# Patient Record
Sex: MALE | Race: WHITE | NOT HISPANIC OR LATINO | Employment: FULL TIME | URBAN - METROPOLITAN AREA
[De-identification: names, ages, dates, MRNs, and addresses within clinical notes are randomized per-mention and may not be internally consistent; named-entity substitution may affect disease eponyms.]

---

## 2017-11-01 ENCOUNTER — HOSPITAL ENCOUNTER (OUTPATIENT)
Dept: FAMILY MEDICINE CLINIC | Facility: CLINIC | Age: 42
Setting detail: SPECIMEN
Discharge: HOME | End: 2017-11-01
Attending: FAMILY MEDICINE | Admitting: FAMILY MEDICINE

## 2017-11-01 LAB
ALBUMIN SERPL-MCNC: 4.4 G/DL (ref 3.5–4.8)
ALBUMIN/GLOB SERPL: 1.6 {RATIO} (ref 1–1.7)
ALP SERPL-CCNC: 44 IU/L (ref 32–91)
ALT SERPL-CCNC: 18 IU/L (ref 17–63)
ANION GAP SERPL CALC-SCNC: 13.9 MMOL/L (ref 10–20)
AST SERPL-CCNC: 26 IU/L (ref 15–41)
BASOPHILS # BLD AUTO: 0 10*3/UL (ref 0–0.2)
BASOPHILS NFR BLD AUTO: 1 % (ref 0–2)
BILIRUB SERPL-MCNC: 0.9 MG/DL (ref 0.3–1.2)
BILIRUB UR QL STRIP: NEGATIVE MG/DL
BUN SERPL-MCNC: 16 MG/DL (ref 8–20)
BUN/CREAT SERPL: 16 (ref 6.2–20.3)
CALCIUM SERPL-MCNC: 9.1 MG/DL (ref 8.9–10.3)
CASTS URNS QL MICRO: NORMAL /[LPF]
CHLORIDE SERPL-SCNC: 101 MMOL/L (ref 101–111)
CHOLEST SERPL-MCNC: 214 MG/DL
CHOLEST/HDLC SERPL: 3.6 {RATIO}
COLOR UR: YELLOW
CONV BACTERIA IN URINE MICRO: NEGATIVE
CONV CLARITY OF URINE: CLEAR
CONV CO2: 25 MMOL/L (ref 22–32)
CONV HYALINE CASTS IN URINE MICRO: 1 /[LPF] (ref 0–5)
CONV LDL CHOLESTEROL DIRECT: 143 MG/DL (ref 0–100)
CONV PROTEIN IN URINE BY AUTOMATED TEST STRIP: NEGATIVE MG/DL
CONV SMALL ROUND CELLS: NORMAL /[HPF]
CONV TOTAL PROTEIN: 7.1 G/DL (ref 6.1–7.9)
CONV UROBILINOGEN IN URINE BY AUTOMATED TEST STRIP: 0.2 MG/DL
CREAT UR-MCNC: 1 MG/DL (ref 0.7–1.2)
CULTURE INDICATED?: NORMAL
DIFFERENTIAL METHOD BLD: (no result)
EOSINOPHIL # BLD AUTO: 0.1 10*3/UL (ref 0–0.3)
EOSINOPHIL # BLD AUTO: 3 % (ref 0–3)
ERYTHROCYTE [DISTWIDTH] IN BLOOD BY AUTOMATED COUNT: 12.8 % (ref 11.5–14.5)
GLOBULIN UR ELPH-MCNC: 2.7 G/DL (ref 2.5–3.8)
GLUCOSE SERPL-MCNC: 94 MG/DL (ref 65–99)
GLUCOSE UR QL: NEGATIVE MG/DL
HCT VFR BLD AUTO: 45.3 % (ref 40–54)
HDLC SERPL-MCNC: 60 MG/DL
HGB BLD-MCNC: 15 G/DL (ref 14–18)
HGB UR QL STRIP: NEGATIVE
KETONES UR QL STRIP: NEGATIVE MG/DL
LDLC/HDLC SERPL: 2.4 {RATIO}
LEUKOCYTE ESTERASE UR QL STRIP: NEGATIVE
LIPID INTERPRETATION: ABNORMAL
LYMPHOCYTES # BLD AUTO: 1 10*3/UL (ref 0.8–4.8)
LYMPHOCYTES NFR BLD AUTO: 27 % (ref 18–42)
MCH RBC QN AUTO: 30.8 PG (ref 26–32)
MCHC RBC AUTO-ENTMCNC: 33.2 G/DL (ref 32–36)
MCV RBC AUTO: 92.8 FL (ref 80–94)
MONOCYTES # BLD AUTO: 0.4 10*3/UL (ref 0.1–1.3)
MONOCYTES NFR BLD AUTO: 11 % (ref 2–11)
NEUTROPHILS # BLD AUTO: 2.1 10*3/UL (ref 2.3–8.6)
NEUTROPHILS NFR BLD AUTO: 58 % (ref 50–75)
NITRITE UR QL STRIP: NEGATIVE
NRBC BLD AUTO-RTO: 0 /100{WBCS}
NRBC/RBC NFR BLD MANUAL: 0 10*3/UL
PH UR STRIP.AUTO: 5.5 [PH] (ref 4.5–8)
PLATELET # BLD AUTO: 208 10*3/UL (ref 150–450)
PMV BLD AUTO: 8.1 FL (ref 7.4–10.4)
POTASSIUM SERPL-SCNC: 3.9 MMOL/L (ref 3.6–5.1)
PSA SERPL-MCNC: 0.25 NG/ML (ref 0–4)
RBC # BLD AUTO: 4.88 10*6/UL (ref 4.6–6)
RBC #/AREA URNS HPF: 0 /[HPF] (ref 0–3)
SODIUM SERPL-SCNC: 136 MMOL/L (ref 136–144)
SP GR UR: 1.02 (ref 1–1.03)
SPERM URNS QL MICRO: NORMAL /[HPF]
SQUAMOUS SPT QL MICRO: 0 /[HPF] (ref 0–5)
T4 FREE SERPL-MCNC: 0.99 NG/DL (ref 0.58–1.64)
TRIGL SERPL-MCNC: 64 MG/DL
TSH SERPL-ACNC: 1.43 UIU/ML (ref 0.34–5.6)
UNIDENT CRYS URNS QL MICRO: NORMAL /[HPF]
VIT B12 SERPL-MCNC: 217 PG/ML (ref 180–914)
VLDLC SERPL CALC-MCNC: 11.3 MG/DL
WBC # BLD AUTO: 3.7 10*3/UL (ref 4.5–11.5)
WBC #/AREA URNS HPF: 0 /[HPF] (ref 0–5)
YEAST SPEC QL WET PREP: NORMAL /[HPF]

## 2019-10-10 RX ORDER — ERGOCALCIFEROL 1.25 MG/1
CAPSULE ORAL
Qty: 4 CAPSULE | Refills: 0 | Status: SHIPPED | OUTPATIENT
Start: 2019-10-10 | End: 2020-08-02 | Stop reason: SDUPTHER

## 2020-01-13 RX ORDER — SERTRALINE HYDROCHLORIDE 100 MG/1
TABLET, FILM COATED ORAL
Qty: 30 TABLET | Refills: 2 | Status: SHIPPED | OUTPATIENT
Start: 2020-01-13 | End: 2020-03-02

## 2020-03-02 RX ORDER — SERTRALINE HYDROCHLORIDE 100 MG/1
TABLET, FILM COATED ORAL
Qty: 30 TABLET | Refills: 2 | Status: SHIPPED | OUTPATIENT
Start: 2020-03-02 | End: 2020-08-02 | Stop reason: SDUPTHER

## 2020-07-09 ENCOUNTER — OFFICE VISIT (OUTPATIENT)
Dept: FAMILY MEDICINE CLINIC | Facility: CLINIC | Age: 45
End: 2020-07-09

## 2020-07-09 ENCOUNTER — LAB (OUTPATIENT)
Dept: FAMILY MEDICINE CLINIC | Facility: CLINIC | Age: 45
End: 2020-07-09

## 2020-07-09 VITALS
RESPIRATION RATE: 8 BRPM | DIASTOLIC BLOOD PRESSURE: 82 MMHG | OXYGEN SATURATION: 98 % | BODY MASS INDEX: 30.62 KG/M2 | TEMPERATURE: 96.8 F | HEART RATE: 70 BPM | SYSTOLIC BLOOD PRESSURE: 110 MMHG | WEIGHT: 231 LBS | HEIGHT: 73 IN

## 2020-07-09 DIAGNOSIS — Z00.00 PREVENTATIVE HEALTH CARE: ICD-10-CM

## 2020-07-09 DIAGNOSIS — R09.1 PLEURISY: Primary | ICD-10-CM

## 2020-07-09 PROBLEM — S43.431A SUPERIOR GLENOID LABRUM LESION OF RIGHT SHOULDER: Status: ACTIVE | Noted: 2020-07-09

## 2020-07-09 PROBLEM — E55.9 VITAMIN D DEFICIENCY: Status: ACTIVE | Noted: 2017-11-03

## 2020-07-09 PROBLEM — F41.0 PANIC DISORDER: Status: ACTIVE | Noted: 2017-11-08

## 2020-07-09 PROBLEM — M75.121 COMPLETE TEAR OF RIGHT ROTATOR CUFF: Status: ACTIVE | Noted: 2018-12-03

## 2020-07-09 LAB
25(OH)D3 SERPL-MCNC: 36.2 NG/ML (ref 30–100)
ALBUMIN SERPL-MCNC: 4.8 G/DL (ref 3.5–5.2)
ALBUMIN/GLOB SERPL: 1.7 G/DL
ALP SERPL-CCNC: 55 U/L (ref 39–117)
ALT SERPL W P-5'-P-CCNC: 13 U/L (ref 1–41)
ANION GAP SERPL CALCULATED.3IONS-SCNC: 10.7 MMOL/L (ref 5–15)
AST SERPL-CCNC: 17 U/L (ref 1–40)
BASOPHILS # BLD AUTO: 0 10*3/MM3 (ref 0–0.2)
BASOPHILS NFR BLD AUTO: 0.9 % (ref 0–1.5)
BILIRUB SERPL-MCNC: 0.8 MG/DL (ref 0–1.2)
BILIRUB UR QL STRIP: NEGATIVE
BUN SERPL-MCNC: 17 MG/DL (ref 6–20)
BUN/CREAT SERPL: 15 (ref 7–25)
CALCIUM SPEC-SCNC: 9.9 MG/DL (ref 8.6–10.5)
CHLORIDE SERPL-SCNC: 101 MMOL/L (ref 98–107)
CHOLEST SERPL-MCNC: 245 MG/DL (ref 0–200)
CLARITY UR: ABNORMAL
CO2 SERPL-SCNC: 26.3 MMOL/L (ref 22–29)
COLOR UR: YELLOW
CREAT SERPL-MCNC: 1.13 MG/DL (ref 0.76–1.27)
DEPRECATED RDW RBC AUTO: 42.4 FL (ref 37–54)
EOSINOPHIL # BLD AUTO: 0.2 10*3/MM3 (ref 0–0.4)
EOSINOPHIL NFR BLD AUTO: 5.9 % (ref 0.3–6.2)
ERYTHROCYTE [DISTWIDTH] IN BLOOD BY AUTOMATED COUNT: 13.2 % (ref 12.3–15.4)
GFR SERPL CREATININE-BSD FRML MDRD: 71 ML/MIN/1.73
GLOBULIN UR ELPH-MCNC: 2.9 GM/DL
GLUCOSE SERPL-MCNC: 92 MG/DL (ref 65–99)
GLUCOSE UR STRIP-MCNC: NEGATIVE MG/DL
HBA1C MFR BLD: 5.1 % (ref 3.5–5.6)
HCT VFR BLD AUTO: 45.7 % (ref 37.5–51)
HDLC SERPL-MCNC: 43 MG/DL (ref 40–60)
HGB BLD-MCNC: 15.5 G/DL (ref 13–17.7)
HGB UR QL STRIP.AUTO: NEGATIVE
KETONES UR QL STRIP: NEGATIVE
LDLC SERPL CALC-MCNC: 185 MG/DL (ref 0–100)
LDLC/HDLC SERPL: 4.3 {RATIO}
LEUKOCYTE ESTERASE UR QL STRIP.AUTO: NEGATIVE
LYMPHOCYTES # BLD AUTO: 1.1 10*3/MM3 (ref 0.7–3.1)
LYMPHOCYTES NFR BLD AUTO: 26.6 % (ref 19.6–45.3)
MCH RBC QN AUTO: 30.9 PG (ref 26.6–33)
MCHC RBC AUTO-ENTMCNC: 33.9 G/DL (ref 31.5–35.7)
MCV RBC AUTO: 91.1 FL (ref 79–97)
MONOCYTES # BLD AUTO: 0.4 10*3/MM3 (ref 0.1–0.9)
MONOCYTES NFR BLD AUTO: 9.8 % (ref 5–12)
NEUTROPHILS NFR BLD AUTO: 2.3 10*3/MM3 (ref 1.7–7)
NEUTROPHILS NFR BLD AUTO: 56.8 % (ref 42.7–76)
NITRITE UR QL STRIP: NEGATIVE
NRBC BLD AUTO-RTO: 0.1 /100 WBC (ref 0–0.2)
PH UR STRIP.AUTO: 5.5 [PH] (ref 5–8)
PLATELET # BLD AUTO: 221 10*3/MM3 (ref 140–450)
PMV BLD AUTO: 8.1 FL (ref 6–12)
POTASSIUM SERPL-SCNC: 4.2 MMOL/L (ref 3.5–5.2)
PROT SERPL-MCNC: 7.7 G/DL (ref 6–8.5)
PROT UR QL STRIP: NEGATIVE
RBC # BLD AUTO: 5.02 10*6/MM3 (ref 4.14–5.8)
SODIUM SERPL-SCNC: 138 MMOL/L (ref 136–145)
SP GR UR STRIP: 1.02 (ref 1–1.03)
T4 FREE SERPL-MCNC: 1.18 NG/DL (ref 0.93–1.7)
TRIGL SERPL-MCNC: 86 MG/DL (ref 0–150)
TSH SERPL DL<=0.05 MIU/L-ACNC: 2.54 UIU/ML (ref 0.27–4.2)
UROBILINOGEN UR QL STRIP: ABNORMAL
VIT B12 BLD-MCNC: 311 PG/ML (ref 211–946)
VLDLC SERPL-MCNC: 17.2 MG/DL (ref 5–40)
WBC # BLD AUTO: 4 10*3/MM3 (ref 3.4–10.8)

## 2020-07-09 PROCEDURE — 82306 VITAMIN D 25 HYDROXY: CPT | Performed by: FAMILY MEDICINE

## 2020-07-09 PROCEDURE — 82607 VITAMIN B-12: CPT | Performed by: FAMILY MEDICINE

## 2020-07-09 PROCEDURE — 99213 OFFICE O/P EST LOW 20 MIN: CPT | Performed by: FAMILY MEDICINE

## 2020-07-09 PROCEDURE — 84439 ASSAY OF FREE THYROXINE: CPT | Performed by: FAMILY MEDICINE

## 2020-07-09 PROCEDURE — 84443 ASSAY THYROID STIM HORMONE: CPT | Performed by: FAMILY MEDICINE

## 2020-07-09 PROCEDURE — 80061 LIPID PANEL: CPT | Performed by: FAMILY MEDICINE

## 2020-07-09 PROCEDURE — 80053 COMPREHEN METABOLIC PANEL: CPT | Performed by: FAMILY MEDICINE

## 2020-07-09 PROCEDURE — 85025 COMPLETE CBC W/AUTO DIFF WBC: CPT | Performed by: FAMILY MEDICINE

## 2020-07-09 PROCEDURE — 81003 URINALYSIS AUTO W/O SCOPE: CPT | Performed by: FAMILY MEDICINE

## 2020-07-09 PROCEDURE — 83036 HEMOGLOBIN GLYCOSYLATED A1C: CPT | Performed by: FAMILY MEDICINE

## 2020-07-09 NOTE — PATIENT INSTRUCTIONS
Pleurisy    Pleurisy, also called pleuritis, is irritation and swelling (inflammation) of the linings of the lungs. The linings of the lungs are called pleura. They cover the outside of the lungs and the inside of the chest wall. There is a small amount of fluid (pleural fluid) between the pleura that allows the lungs to move in and out smoothly when you breathe. Pleurisy causes the pleura to be rough and dry and to rub together when you breathe, which is painful. In some cases, pleurisy can cause pleural fluid to build up between the pleura (pleural effusion).  What are the causes?  Common causes of this condition include:  · A lung infection caused by bacteria or a virus.  · A blood clot that travels to the lung (pulmonary embolism).  · Air leaking into the pleural space (pneumothorax).  · Lung cancer or a lung tumor.  · A chest injury.  · Diseases that can cause lung inflammation. These include rheumatoid arthritis, lupus, sickle cell disease, inflammatory bowel disease, and pancreatitis.  · Heart or chest surgery.  · Lung damage from inhaling asbestos.  · A lung reaction to certain medicines.  Sometimes the cause is unknown.  What are the signs or symptoms?  Chest pain is the main symptom of this condition. The pain is usually on one side. Chest pain may start suddenly and be sharp or stabbing. It may become a constant dull ache. You may also feel pain in your back or shoulder. The pain may get worse when you cough, take deep breaths, or make sudden movements. Other symptoms may include:  · Shortness of breath.  · Noisy breathing (wheezing).  · Cough.  · Chills.  · Fever.  How is this diagnosed?  This condition may be diagnosed based on:  · Your medical history.  · Your symptoms.  · A physical exam. Your health care provider will listen to your breathing with a stethoscope to check for a rough, rubbing sound (friction rub). If you have pleural effusion, your breathing sounds may be muffled.  · Tests, such  as:  ? Blood tests to check for infections or diseases and to measure the oxygen in your blood.  ? Imaging studies of your lungs. These may include a chest X-ray, ultrasound, MRI, or CT scan.  ? A procedure to remove pleural fluid with a needle for testing (thoracentesis).  How is this treated?  Treatment for this condition depends on the cause. Pleurisy that was caused by a virus usually clears up within 2 weeks. Treatment for pleurisy may include:  · NSAIDs to help relieve pain and swelling.  · Antibiotic medicines, if your condition was caused by a bacterial infection.  · Prescription pain or cough medicine.  · Medicines to dissolve a blood clot, if your condition was caused by pulmonary embolism.  · Removal of pleural fluid or air.  Follow these instructions at home:  Medicines  · Take over-the-counter and prescription medicines only as told by your health care provider.  · If you were prescribed an antibiotic, take it as told by your health care provider. Do not stop taking the antibiotic even if you start to feel better.  Activity  · Rest and return to your normal activities as told by your health care provider. Ask your health care provider what activities are safe for you.  · Do not drive or use heavy machinery while taking prescription pain medicine.  General instructions    · Monitor your pleurisy for any changes.  · Take deep breaths often, even if it is painful. This can help prevent lung infection (pneumonia) and collapse of lung tissue (atelectasis).  · When lying down, lie on your painful side. This may reduce pain.  · Do not smoke. If you need help quitting, ask your health care provider.  · Keep all follow-up visits as told by your health care provider. This is important.  Contact a health care provider if:  · You have pain that:  ? Gets worse.  ? Does not get better with medicine.  ? Lasts for more than 1 week.  · You have a fever or chills.  · Your cough or shortness of breath is not improving at  home.  · You cough up pus-like (purulent) secretions.  Get help right away if:  · Your lips, fingernails, or toenails darken or turn blue.  · You cough up blood.  · You have any of the following symptoms that get worse:  ? Difficulty breathing.  ? Shortness of breath.  ? Wheezing.  · You have pain that spreads into your neck, arms, or jaw.  · You develop a rash.  · You vomit.  · You faint.  Summary  · Pleurisy is inflammation of the linings of the lungs (pleura).  · Pleurisy causes pain that makes it difficult for you to breathe or cough.  · Pleurisy is often caused by an underlying infection or disease.  · Treatment of pleurisy depends on the cause, and it often includes medicines.  This information is not intended to replace advice given to you by your health care provider. Make sure you discuss any questions you have with your health care provider.  Document Released: 12/18/2006 Document Revised: 11/30/2018 Document Reviewed: 09/11/2017  Elsevier Patient Education © 2020 [employer]

## 2020-07-09 NOTE — PROGRESS NOTES
"Rooming Tab(CC,VS,Pt Hx,Fall Screen)  Chief Complaint   Patient presents with   • Pain     rt rib       Subjective    Patient with right sided rib pain.  Very localized.   No rub.  No fever.  No sweats.          I have reviewed and updated his medications, medical history and problem list during today's office visit.     Patient Care Team:  Brando Piper MD as PCP - General (Family Medicine)    Problem List Tab  Medications Tab  Synopsis Tab  Chart Review Tab  Care Everywhere Tab  Immunizations Tab  Patient History Tab    Social History     Tobacco Use   • Smoking status: Not on file   Substance Use Topics   • Alcohol use: Not on file       Review of Systems   Constitutional: Negative for activity change, appetite change, fatigue, fever and unexpected weight loss.   HENT: Negative for congestion, ear pain, hearing loss, postnasal drip, rhinorrhea, sinus pressure, sneezing, sore throat and swollen glands.    Eyes: Negative for blurred vision.   Respiratory: Negative for cough, shortness of breath and wheezing.    Cardiovascular: Negative for chest pain.   Gastrointestinal: Negative for abdominal pain, nausea and indigestion.   Genitourinary: Negative for dysuria, urgency and urinary incontinence.   Musculoskeletal: Negative for arthralgias, back pain, joint swelling and myalgias.   Skin: Negative for dry skin and skin lesions.   Allergic/Immunologic: Negative for environmental allergies.   Neurological: Negative for dizziness, headache, memory problem and confusion.   Hematological: Negative for adenopathy.   Psychiatric/Behavioral: Negative for agitation, depressed mood and stress. The patient is not nervous/anxious.    All other systems reviewed and are negative.      Objective     Rooming Tab(CC,VS,Pt Hx,Fall Screen)  /82   Pulse 70   Temp 96.8 °F (36 °C) (Temporal)   Resp 8   Ht 185.4 cm (73\")   Wt 105 kg (231 lb)   SpO2 98%   BMI 30.48 kg/m²     Body mass index is 30.48 kg/m².    Physical Exam "   Constitutional: He is oriented to person, place, and time. He appears well-developed and well-nourished.   HENT:   Head: Normocephalic and atraumatic.   Right Ear: External ear normal.   Left Ear: External ear normal.   Nose: Nose normal.   Mouth/Throat: Oropharynx is clear and moist. No oropharyngeal exudate.   Eyes: Pupils are equal, round, and reactive to light. Conjunctivae and EOM are normal. Right eye exhibits no discharge. Left eye exhibits no discharge. No scleral icterus.   Neck: Normal range of motion. Neck supple. No JVD present. No thyromegaly present.   Cardiovascular: Normal rate, regular rhythm, normal heart sounds and intact distal pulses.   No murmur heard.  Pulmonary/Chest: Effort normal and breath sounds normal. No respiratory distress. He has no wheezes. He has no rales. He exhibits no tenderness.   Abdominal: Soft. Bowel sounds are normal. He exhibits no distension. There is no tenderness.   Genitourinary: Rectal exam shows guaiac negative stool.   Musculoskeletal: Normal range of motion. He exhibits no edema, tenderness or deformity.   Lymphadenopathy:     He has no cervical adenopathy.   Neurological: He is alert and oriented to person, place, and time.   Skin: Skin is warm and dry.   Psychiatric: He has a normal mood and affect. His behavior is normal. Judgment and thought content normal.   Vitals reviewed.       Statin Choice Calculator  Data Reviewed:                   Assessment/Plan   Order Review Tab  Health Maintenance Tab  Patient Plan/Order Tab  Diagnoses and all orders for this visit:    1. Pleurisy (Primary)  Assessment & Plan:  Ibuprofen 800mg  Bid.   If not better in 2 weeks then X-ray  Viral induced        Wrapup Tab  No follow-ups on file.

## 2020-07-10 NOTE — PROGRESS NOTES
Mary tell Ashkan that his labs look great except for the lipid panel as we suspected.  Have him follow a low-carb diet and exercise as he was planning to do and we will repeat his lipid panel in about 4 to 6 months.  If it does not come down we have medication that will bring it down.  I think he has a physical scheduled later this year.  We will recheck it then

## 2020-08-03 RX ORDER — SERTRALINE HYDROCHLORIDE 100 MG/1
100 TABLET, FILM COATED ORAL DAILY
Qty: 30 TABLET | Refills: 2 | Status: SHIPPED | OUTPATIENT
Start: 2020-08-03 | End: 2021-03-23

## 2020-08-03 RX ORDER — ERGOCALCIFEROL 1.25 MG/1
50000 CAPSULE ORAL WEEKLY
Qty: 4 CAPSULE | Refills: 0 | Status: SHIPPED | OUTPATIENT
Start: 2020-08-03 | End: 2021-03-04

## 2021-03-03 ENCOUNTER — APPOINTMENT (OUTPATIENT)
Dept: GENERAL RADIOLOGY | Facility: HOSPITAL | Age: 46
End: 2021-03-03

## 2021-03-03 ENCOUNTER — HOSPITAL ENCOUNTER (OUTPATIENT)
Facility: HOSPITAL | Age: 46
Discharge: HOME | End: 2021-03-05
Attending: FAMILY MEDICINE | Admitting: FAMILY MEDICINE

## 2021-03-03 DIAGNOSIS — I48.91 NEW ONSET ATRIAL FIBRILLATION (HCC): ICD-10-CM

## 2021-03-03 DIAGNOSIS — E78.5 HYPERLIPIDEMIA LDL GOAL <70: ICD-10-CM

## 2021-03-03 DIAGNOSIS — R00.2 PALPITATIONS: ICD-10-CM

## 2021-03-03 DIAGNOSIS — R94.39 ABNORMAL NUCLEAR STRESS TEST: Primary | ICD-10-CM

## 2021-03-03 LAB
BASOPHILS # BLD AUTO: 0 10*3/MM3 (ref 0–0.2)
BASOPHILS NFR BLD AUTO: 0.8 % (ref 0–1.5)
DEPRECATED RDW RBC AUTO: 41.6 FL (ref 37–54)
EOSINOPHIL # BLD AUTO: 0.2 10*3/MM3 (ref 0–0.4)
EOSINOPHIL NFR BLD AUTO: 3.8 % (ref 0.3–6.2)
ERYTHROCYTE [DISTWIDTH] IN BLOOD BY AUTOMATED COUNT: 13.1 % (ref 12.3–15.4)
HCT VFR BLD AUTO: 43 % (ref 37.5–51)
HGB BLD-MCNC: 14.7 G/DL (ref 13–17.7)
LYMPHOCYTES # BLD AUTO: 1.6 10*3/MM3 (ref 0.7–3.1)
LYMPHOCYTES NFR BLD AUTO: 37.4 % (ref 19.6–45.3)
MCH RBC QN AUTO: 30.9 PG (ref 26.6–33)
MCHC RBC AUTO-ENTMCNC: 34.1 G/DL (ref 31.5–35.7)
MCV RBC AUTO: 90.5 FL (ref 79–97)
MONOCYTES # BLD AUTO: 0.4 10*3/MM3 (ref 0.1–0.9)
MONOCYTES NFR BLD AUTO: 9.4 % (ref 5–12)
NEUTROPHILS NFR BLD AUTO: 2.1 10*3/MM3 (ref 1.7–7)
NEUTROPHILS NFR BLD AUTO: 48.6 % (ref 42.7–76)
NRBC BLD AUTO-RTO: 0.2 /100 WBC (ref 0–0.2)
PLATELET # BLD AUTO: 211 10*3/MM3 (ref 140–450)
PMV BLD AUTO: 7.9 FL (ref 6–12)
RBC # BLD AUTO: 4.75 10*6/MM3 (ref 4.14–5.8)
WBC # BLD AUTO: 4.4 10*3/MM3 (ref 3.4–10.8)

## 2021-03-03 PROCEDURE — 96376 TX/PRO/DX INJ SAME DRUG ADON: CPT

## 2021-03-03 PROCEDURE — 83735 ASSAY OF MAGNESIUM: CPT | Performed by: NURSE PRACTITIONER

## 2021-03-03 PROCEDURE — 84484 ASSAY OF TROPONIN QUANT: CPT | Performed by: EMERGENCY MEDICINE

## 2021-03-03 PROCEDURE — 96365 THER/PROPH/DIAG IV INF INIT: CPT

## 2021-03-03 PROCEDURE — 85730 THROMBOPLASTIN TIME PARTIAL: CPT | Performed by: EMERGENCY MEDICINE

## 2021-03-03 PROCEDURE — 71045 X-RAY EXAM CHEST 1 VIEW: CPT

## 2021-03-03 PROCEDURE — 99284 EMERGENCY DEPT VISIT MOD MDM: CPT

## 2021-03-03 PROCEDURE — 85025 COMPLETE CBC W/AUTO DIFF WBC: CPT | Performed by: EMERGENCY MEDICINE

## 2021-03-03 PROCEDURE — 80053 COMPREHEN METABOLIC PANEL: CPT | Performed by: EMERGENCY MEDICINE

## 2021-03-03 PROCEDURE — 85610 PROTHROMBIN TIME: CPT | Performed by: EMERGENCY MEDICINE

## 2021-03-03 PROCEDURE — 84443 ASSAY THYROID STIM HORMONE: CPT | Performed by: EMERGENCY MEDICINE

## 2021-03-03 PROCEDURE — 93005 ELECTROCARDIOGRAM TRACING: CPT

## 2021-03-03 PROCEDURE — 85379 FIBRIN DEGRADATION QUANT: CPT | Performed by: NURSE PRACTITIONER

## 2021-03-03 PROCEDURE — 93005 ELECTROCARDIOGRAM TRACING: CPT | Performed by: EMERGENCY MEDICINE

## 2021-03-03 RX ORDER — DILTIAZEM HYDROCHLORIDE 5 MG/ML
20 INJECTION INTRAVENOUS ONCE
Status: COMPLETED | OUTPATIENT
Start: 2021-03-03 | End: 2021-03-03

## 2021-03-03 RX ORDER — ASPIRIN 325 MG
325 TABLET ORAL ONCE
Status: COMPLETED | OUTPATIENT
Start: 2021-03-03 | End: 2021-03-03

## 2021-03-03 RX ADMIN — DILTIAZEM HYDROCHLORIDE 20 MG: 5 INJECTION INTRAVENOUS at 23:50

## 2021-03-03 RX ADMIN — ASPIRIN 325 MG ORAL TABLET 325 MG: 325 PILL ORAL at 23:49

## 2021-03-03 RX ADMIN — SODIUM CHLORIDE 10 MG/HR: 900 INJECTION, SOLUTION INTRAVENOUS at 23:49

## 2021-03-03 RX ADMIN — SODIUM CHLORIDE 500 ML: 9 INJECTION, SOLUTION INTRAVENOUS at 23:49

## 2021-03-04 ENCOUNTER — APPOINTMENT (OUTPATIENT)
Dept: NUCLEAR MEDICINE | Facility: HOSPITAL | Age: 46
End: 2021-03-04

## 2021-03-04 ENCOUNTER — APPOINTMENT (OUTPATIENT)
Dept: CARDIOLOGY | Facility: HOSPITAL | Age: 46
End: 2021-03-04

## 2021-03-04 PROBLEM — F32.A DEPRESSION: Chronic | Status: ACTIVE | Noted: 2021-03-04

## 2021-03-04 PROBLEM — R94.39 ABNORMAL NUCLEAR STRESS TEST: Status: ACTIVE | Noted: 2021-03-03

## 2021-03-04 PROBLEM — I48.91 NEW ONSET ATRIAL FIBRILLATION: Status: ACTIVE | Noted: 2021-03-04

## 2021-03-04 PROBLEM — E78.5 HYPERLIPIDEMIA LDL GOAL <70: Status: ACTIVE | Noted: 2021-03-03

## 2021-03-04 LAB
ALBUMIN SERPL-MCNC: 4.5 G/DL (ref 3.5–5.2)
ALBUMIN/GLOB SERPL: 1.7 G/DL
ALP SERPL-CCNC: 55 U/L (ref 39–117)
ALT SERPL W P-5'-P-CCNC: 21 U/L (ref 1–41)
ANION GAP SERPL CALCULATED.3IONS-SCNC: 13 MMOL/L (ref 5–15)
ANION GAP SERPL CALCULATED.3IONS-SCNC: 16 MMOL/L (ref 5–15)
ANION GAP SERPL CALCULATED.3IONS-SCNC: 9 MMOL/L (ref 5–15)
APTT PPP: 25.1 SECONDS (ref 24–31)
AST SERPL-CCNC: 25 U/L (ref 1–40)
BASOPHILS # BLD AUTO: 0 10*3/MM3 (ref 0–0.2)
BASOPHILS NFR BLD AUTO: 0.7 % (ref 0–1.5)
BILIRUB SERPL-MCNC: 0.3 MG/DL (ref 0–1.2)
BUN SERPL-MCNC: 17 MG/DL (ref 6–20)
BUN SERPL-MCNC: 20 MG/DL (ref 6–20)
BUN SERPL-MCNC: 21 MG/DL (ref 6–20)
BUN/CREAT SERPL: 16.7 (ref 7–25)
BUN/CREAT SERPL: 18.4 (ref 7–25)
BUN/CREAT SERPL: 19 (ref 7–25)
CALCIUM SPEC-SCNC: 9.1 MG/DL (ref 8.6–10.5)
CALCIUM SPEC-SCNC: 9.2 MG/DL (ref 8.6–10.5)
CALCIUM SPEC-SCNC: 9.5 MG/DL (ref 8.6–10.5)
CHLORIDE SERPL-SCNC: 100 MMOL/L (ref 98–107)
CHLORIDE SERPL-SCNC: 103 MMOL/L (ref 98–107)
CHLORIDE SERPL-SCNC: 106 MMOL/L (ref 98–107)
CHOLEST SERPL-MCNC: 305 MG/DL (ref 0–200)
CO2 SERPL-SCNC: 22 MMOL/L (ref 22–29)
CO2 SERPL-SCNC: 23 MMOL/L (ref 22–29)
CO2 SERPL-SCNC: 26 MMOL/L (ref 22–29)
CREAT SERPL-MCNC: 1.02 MG/DL (ref 0.76–1.27)
CREAT SERPL-MCNC: 1.05 MG/DL (ref 0.76–1.27)
CREAT SERPL-MCNC: 1.14 MG/DL (ref 0.76–1.27)
D DIMER PPP FEU-MCNC: <0.19 MG/L (FEU) (ref 0–0.59)
DEPRECATED RDW RBC AUTO: 41.1 FL (ref 37–54)
EOSINOPHIL # BLD AUTO: 0.1 10*3/MM3 (ref 0–0.4)
EOSINOPHIL NFR BLD AUTO: 2.3 % (ref 0.3–6.2)
ERYTHROCYTE [DISTWIDTH] IN BLOOD BY AUTOMATED COUNT: 13.2 % (ref 12.3–15.4)
GFR SERPL CREATININE-BSD FRML MDRD: 70 ML/MIN/1.73
GFR SERPL CREATININE-BSD FRML MDRD: 77 ML/MIN/1.73
GFR SERPL CREATININE-BSD FRML MDRD: 80 ML/MIN/1.73
GLOBULIN UR ELPH-MCNC: 2.7 GM/DL
GLUCOSE SERPL-MCNC: 73 MG/DL (ref 65–99)
GLUCOSE SERPL-MCNC: 87 MG/DL (ref 65–99)
GLUCOSE SERPL-MCNC: 92 MG/DL (ref 65–99)
HBA1C MFR BLD: 5.3 % (ref 3.5–5.6)
HCT VFR BLD AUTO: 42.2 % (ref 37.5–51)
HDLC SERPL-MCNC: 35 MG/DL (ref 40–60)
HGB BLD-MCNC: 14.5 G/DL (ref 13–17.7)
HOLD SPECIMEN: NORMAL
INR PPP: 0.97 (ref 0.93–1.1)
LDLC SERPL CALC-MCNC: 253 MG/DL (ref 0–100)
LDLC/HDLC SERPL: 7.16 {RATIO}
LYMPHOCYTES # BLD AUTO: 1.1 10*3/MM3 (ref 0.7–3.1)
LYMPHOCYTES NFR BLD AUTO: 27.6 % (ref 19.6–45.3)
MAGNESIUM SERPL-MCNC: 2 MG/DL (ref 1.6–2.6)
MAGNESIUM SERPL-MCNC: 2 MG/DL (ref 1.6–2.6)
MCH RBC QN AUTO: 31 PG (ref 26.6–33)
MCHC RBC AUTO-ENTMCNC: 34.2 G/DL (ref 31.5–35.7)
MCV RBC AUTO: 90.4 FL (ref 79–97)
MONOCYTES # BLD AUTO: 0.3 10*3/MM3 (ref 0.1–0.9)
MONOCYTES NFR BLD AUTO: 8.2 % (ref 5–12)
NEUTROPHILS NFR BLD AUTO: 2.4 10*3/MM3 (ref 1.7–7)
NEUTROPHILS NFR BLD AUTO: 61.2 % (ref 42.7–76)
NRBC BLD AUTO-RTO: 0.1 /100 WBC (ref 0–0.2)
PLATELET # BLD AUTO: 219 10*3/MM3 (ref 140–450)
PMV BLD AUTO: 8.2 FL (ref 6–12)
POTASSIUM SERPL-SCNC: 3.7 MMOL/L (ref 3.5–5.2)
POTASSIUM SERPL-SCNC: 4.3 MMOL/L (ref 3.5–5.2)
POTASSIUM SERPL-SCNC: 5 MMOL/L (ref 3.5–5.2)
PROT SERPL-MCNC: 7.2 G/DL (ref 6–8.5)
PROTHROMBIN TIME: 10.7 SECONDS (ref 9.6–11.7)
RBC # BLD AUTO: 4.67 10*6/MM3 (ref 4.14–5.8)
SARS-COV-2 ORF1AB RESP QL NAA+PROBE: NOT DETECTED
SODIUM SERPL-SCNC: 138 MMOL/L (ref 136–145)
SODIUM SERPL-SCNC: 139 MMOL/L (ref 136–145)
SODIUM SERPL-SCNC: 141 MMOL/L (ref 136–145)
T4 FREE SERPL-MCNC: 1.11 NG/DL (ref 0.93–1.7)
TRIGL SERPL-MCNC: 97 MG/DL (ref 0–150)
TROPONIN T SERPL-MCNC: <0.01 NG/ML (ref 0–0.03)
TSH SERPL DL<=0.05 MIU/L-ACNC: 5.22 UIU/ML (ref 0.27–4.2)
VLDLC SERPL-MCNC: 17 MG/DL (ref 5–40)
WBC # BLD AUTO: 3.9 10*3/MM3 (ref 3.4–10.8)

## 2021-03-04 PROCEDURE — G0378 HOSPITAL OBSERVATION PER HR: HCPCS

## 2021-03-04 PROCEDURE — 84484 ASSAY OF TROPONIN QUANT: CPT | Performed by: NURSE PRACTITIONER

## 2021-03-04 PROCEDURE — U0004 COV-19 TEST NON-CDC HGH THRU: HCPCS | Performed by: EMERGENCY MEDICINE

## 2021-03-04 PROCEDURE — 78452 HT MUSCLE IMAGE SPECT MULT: CPT | Performed by: INTERNAL MEDICINE

## 2021-03-04 PROCEDURE — 80048 BASIC METABOLIC PNL TOTAL CA: CPT | Performed by: NURSE PRACTITIONER

## 2021-03-04 PROCEDURE — 99205 OFFICE O/P NEW HI 60 MIN: CPT | Performed by: INTERNAL MEDICINE

## 2021-03-04 PROCEDURE — 99152 MOD SED SAME PHYS/QHP 5/>YRS: CPT | Performed by: INTERNAL MEDICINE

## 2021-03-04 PROCEDURE — 93017 CV STRESS TEST TRACING ONLY: CPT

## 2021-03-04 PROCEDURE — 93005 ELECTROCARDIOGRAM TRACING: CPT | Performed by: INTERNAL MEDICINE

## 2021-03-04 PROCEDURE — 25010000002 AMIODARONE IN DEXTROSE 5% 150-4.21 MG/100ML-% SOLUTION: Performed by: INTERNAL MEDICINE

## 2021-03-04 PROCEDURE — 25010000002 ENOXAPARIN PER 10 MG: Performed by: NURSE PRACTITIONER

## 2021-03-04 PROCEDURE — 96375 TX/PRO/DX INJ NEW DRUG ADDON: CPT

## 2021-03-04 PROCEDURE — 80061 LIPID PANEL: CPT | Performed by: NURSE PRACTITIONER

## 2021-03-04 PROCEDURE — 83735 ASSAY OF MAGNESIUM: CPT | Performed by: NURSE PRACTITIONER

## 2021-03-04 PROCEDURE — 96372 THER/PROPH/DIAG INJ SC/IM: CPT

## 2021-03-04 PROCEDURE — 25010000002 REGADENOSON 0.4 MG/5ML SOLUTION: Performed by: FAMILY MEDICINE

## 2021-03-04 PROCEDURE — 96366 THER/PROPH/DIAG IV INF ADDON: CPT

## 2021-03-04 PROCEDURE — 25010000002 KETOROLAC TROMETHAMINE PER 15 MG: Performed by: NURSE PRACTITIONER

## 2021-03-04 PROCEDURE — 93005 ELECTROCARDIOGRAM TRACING: CPT | Performed by: NURSE PRACTITIONER

## 2021-03-04 PROCEDURE — A9500 TC99M SESTAMIBI: HCPCS | Performed by: FAMILY MEDICINE

## 2021-03-04 PROCEDURE — 25010000002 AMIODARONE PER 30 MG: Performed by: INTERNAL MEDICINE

## 2021-03-04 PROCEDURE — 93018 CV STRESS TEST I&R ONLY: CPT | Performed by: NURSE PRACTITIONER

## 2021-03-04 PROCEDURE — 78452 HT MUSCLE IMAGE SPECT MULT: CPT

## 2021-03-04 PROCEDURE — 93306 TTE W/DOPPLER COMPLETE: CPT

## 2021-03-04 PROCEDURE — 0 TECHNETIUM SESTAMIBI: Performed by: FAMILY MEDICINE

## 2021-03-04 PROCEDURE — 80048 BASIC METABOLIC PNL TOTAL CA: CPT | Performed by: INTERNAL MEDICINE

## 2021-03-04 PROCEDURE — 93306 TTE W/DOPPLER COMPLETE: CPT | Performed by: INTERNAL MEDICINE

## 2021-03-04 PROCEDURE — 83036 HEMOGLOBIN GLYCOSYLATED A1C: CPT | Performed by: NURSE PRACTITIONER

## 2021-03-04 PROCEDURE — 99220 PR INITIAL OBSERVATION CARE/DAY 70 MINUTES: CPT | Performed by: FAMILY MEDICINE

## 2021-03-04 PROCEDURE — 85025 COMPLETE CBC W/AUTO DIFF WBC: CPT | Performed by: NURSE PRACTITIONER

## 2021-03-04 PROCEDURE — 84439 ASSAY OF FREE THYROXINE: CPT | Performed by: FAMILY MEDICINE

## 2021-03-04 PROCEDURE — 93010 ELECTROCARDIOGRAM REPORT: CPT | Performed by: INTERNAL MEDICINE

## 2021-03-04 PROCEDURE — 96368 THER/DIAG CONCURRENT INF: CPT

## 2021-03-04 RX ORDER — ONDANSETRON 2 MG/ML
4 INJECTION INTRAMUSCULAR; INTRAVENOUS EVERY 6 HOURS PRN
Status: CANCELLED | OUTPATIENT
Start: 2021-03-04

## 2021-03-04 RX ORDER — AMIODARONE HCL/D5W 450 MG/250
1 PLASTIC BAG, INJECTION (ML) INTRAVENOUS CONTINUOUS
Status: DISPENSED | OUTPATIENT
Start: 2021-03-04 | End: 2021-03-04

## 2021-03-04 RX ORDER — ACETAMINOPHEN 325 MG/1
650 TABLET ORAL EVERY 4 HOURS PRN
Status: CANCELLED | OUTPATIENT
Start: 2021-03-04

## 2021-03-04 RX ORDER — METOPROLOL TARTRATE 5 MG/5ML
2.5 INJECTION INTRAVENOUS 4 TIMES DAILY PRN
Status: DISCONTINUED | OUTPATIENT
Start: 2021-03-04 | End: 2021-03-05 | Stop reason: HOSPADM

## 2021-03-04 RX ORDER — ACETAMINOPHEN 650 MG/1
650 SUPPOSITORY RECTAL EVERY 4 HOURS PRN
Status: CANCELLED | OUTPATIENT
Start: 2021-03-04

## 2021-03-04 RX ORDER — NITROGLYCERIN 0.4 MG/1
0.4 TABLET SUBLINGUAL
Status: DISCONTINUED | OUTPATIENT
Start: 2021-03-04 | End: 2021-03-05 | Stop reason: HOSPADM

## 2021-03-04 RX ORDER — HYDROCODONE BITARTRATE AND ACETAMINOPHEN 5; 325 MG/1; MG/1
1 TABLET ORAL EVERY 4 HOURS PRN
Status: CANCELLED | OUTPATIENT
Start: 2021-03-04 | End: 2021-03-14

## 2021-03-04 RX ORDER — ALUMINA, MAGNESIA, AND SIMETHICONE 2400; 2400; 240 MG/30ML; MG/30ML; MG/30ML
15 SUSPENSION ORAL EVERY 6 HOURS PRN
Status: DISCONTINUED | OUTPATIENT
Start: 2021-03-04 | End: 2021-03-05

## 2021-03-04 RX ORDER — BISACODYL 10 MG
10 SUPPOSITORY, RECTAL RECTAL DAILY PRN
Status: DISCONTINUED | OUTPATIENT
Start: 2021-03-04 | End: 2021-03-05 | Stop reason: HOSPADM

## 2021-03-04 RX ORDER — ACETAMINOPHEN 650 MG/1
650 SUPPOSITORY RECTAL EVERY 4 HOURS PRN
Status: DISCONTINUED | OUTPATIENT
Start: 2021-03-04 | End: 2021-03-05 | Stop reason: HOSPADM

## 2021-03-04 RX ORDER — POTASSIUM CHLORIDE 20 MEQ/1
40 TABLET, EXTENDED RELEASE ORAL AS NEEDED
Status: DISCONTINUED | OUTPATIENT
Start: 2021-03-04 | End: 2021-03-05 | Stop reason: HOSPADM

## 2021-03-04 RX ORDER — METHYLPREDNISOLONE SODIUM SUCCINATE 125 MG/2ML
120 INJECTION, POWDER, LYOPHILIZED, FOR SOLUTION INTRAMUSCULAR; INTRAVENOUS ONCE
Status: CANCELLED | OUTPATIENT
Start: 2021-03-04 | End: 2021-03-04

## 2021-03-04 RX ORDER — MAGNESIUM SULFATE HEPTAHYDRATE 40 MG/ML
2 INJECTION, SOLUTION INTRAVENOUS AS NEEDED
Status: DISCONTINUED | OUTPATIENT
Start: 2021-03-04 | End: 2021-03-05 | Stop reason: HOSPADM

## 2021-03-04 RX ORDER — ALUMINA, MAGNESIA, AND SIMETHICONE 2400; 2400; 240 MG/30ML; MG/30ML; MG/30ML
15 SUSPENSION ORAL EVERY 6 HOURS PRN
Status: CANCELLED | OUTPATIENT
Start: 2021-03-04

## 2021-03-04 RX ORDER — DIPHENHYDRAMINE HYDROCHLORIDE 50 MG/ML
50 INJECTION INTRAMUSCULAR; INTRAVENOUS ONCE
Status: CANCELLED | OUTPATIENT
Start: 2021-03-04 | End: 2021-03-04

## 2021-03-04 RX ORDER — BISACODYL 10 MG
10 SUPPOSITORY, RECTAL RECTAL DAILY PRN
Status: CANCELLED | OUTPATIENT
Start: 2021-03-04

## 2021-03-04 RX ORDER — ACETAMINOPHEN 160 MG/5ML
650 SOLUTION ORAL EVERY 4 HOURS PRN
Status: DISCONTINUED | OUTPATIENT
Start: 2021-03-04 | End: 2021-03-05 | Stop reason: HOSPADM

## 2021-03-04 RX ORDER — MELATONIN
1000 DAILY
COMMUNITY

## 2021-03-04 RX ORDER — ONDANSETRON 4 MG/1
4 TABLET, FILM COATED ORAL EVERY 6 HOURS PRN
Status: CANCELLED | OUTPATIENT
Start: 2021-03-04

## 2021-03-04 RX ORDER — CHOLECALCIFEROL (VITAMIN D3) 125 MCG
5 CAPSULE ORAL NIGHTLY PRN
Status: DISCONTINUED | OUTPATIENT
Start: 2021-03-04 | End: 2021-03-05 | Stop reason: HOSPADM

## 2021-03-04 RX ORDER — NITROGLYCERIN 0.4 MG/1
0.4 TABLET SUBLINGUAL
Status: CANCELLED | OUTPATIENT
Start: 2021-03-04

## 2021-03-04 RX ORDER — SODIUM CHLORIDE 0.9 % (FLUSH) 0.9 %
3-10 SYRINGE (ML) INJECTION AS NEEDED
Status: CANCELLED | OUTPATIENT
Start: 2021-03-04

## 2021-03-04 RX ORDER — ASPIRIN 81 MG/1
324 TABLET, CHEWABLE ORAL ONCE
Status: CANCELLED | OUTPATIENT
Start: 2021-03-04 | End: 2021-03-04

## 2021-03-04 RX ORDER — ONDANSETRON 2 MG/ML
4 INJECTION INTRAMUSCULAR; INTRAVENOUS EVERY 6 HOURS PRN
Status: DISCONTINUED | OUTPATIENT
Start: 2021-03-04 | End: 2021-03-05 | Stop reason: HOSPADM

## 2021-03-04 RX ORDER — ACETAMINOPHEN 160 MG/5ML
650 SOLUTION ORAL EVERY 4 HOURS PRN
Status: CANCELLED | OUTPATIENT
Start: 2021-03-04

## 2021-03-04 RX ORDER — MAGNESIUM SULFATE HEPTAHYDRATE 40 MG/ML
4 INJECTION, SOLUTION INTRAVENOUS AS NEEDED
Status: DISCONTINUED | OUTPATIENT
Start: 2021-03-04 | End: 2021-03-05 | Stop reason: HOSPADM

## 2021-03-04 RX ORDER — AMIODARONE HCL/D5W 450 MG/250
1 PLASTIC BAG, INJECTION (ML) INTRAVENOUS
Status: DISCONTINUED | OUTPATIENT
Start: 2021-03-04 | End: 2021-03-04

## 2021-03-04 RX ORDER — KETOROLAC TROMETHAMINE 15 MG/ML
15 INJECTION, SOLUTION INTRAMUSCULAR; INTRAVENOUS EVERY 6 HOURS PRN
Status: DISCONTINUED | OUTPATIENT
Start: 2021-03-04 | End: 2021-03-05 | Stop reason: HOSPADM

## 2021-03-04 RX ORDER — ACETAMINOPHEN 325 MG/1
650 TABLET ORAL EVERY 4 HOURS PRN
Status: DISCONTINUED | OUTPATIENT
Start: 2021-03-04 | End: 2021-03-05 | Stop reason: HOSPADM

## 2021-03-04 RX ORDER — ASPIRIN 81 MG/1
81 TABLET ORAL DAILY
Status: CANCELLED | OUTPATIENT
Start: 2021-03-04

## 2021-03-04 RX ORDER — SERTRALINE HYDROCHLORIDE 100 MG/1
100 TABLET, FILM COATED ORAL DAILY
Status: DISCONTINUED | OUTPATIENT
Start: 2021-03-04 | End: 2021-03-05 | Stop reason: HOSPADM

## 2021-03-04 RX ORDER — POTASSIUM CHLORIDE 1.5 G/1.77G
40 POWDER, FOR SOLUTION ORAL AS NEEDED
Status: DISCONTINUED | OUTPATIENT
Start: 2021-03-04 | End: 2021-03-05 | Stop reason: HOSPADM

## 2021-03-04 RX ORDER — MELATONIN
1000 DAILY
Status: DISCONTINUED | OUTPATIENT
Start: 2021-03-04 | End: 2021-03-05 | Stop reason: HOSPADM

## 2021-03-04 RX ORDER — CHOLECALCIFEROL (VITAMIN D3) 125 MCG
5 CAPSULE ORAL NIGHTLY PRN
Status: CANCELLED | OUTPATIENT
Start: 2021-03-04

## 2021-03-04 RX ORDER — ONDANSETRON 4 MG/1
4 TABLET, FILM COATED ORAL EVERY 6 HOURS PRN
Status: DISCONTINUED | OUTPATIENT
Start: 2021-03-04 | End: 2021-03-05 | Stop reason: HOSPADM

## 2021-03-04 RX ORDER — KETOROLAC TROMETHAMINE 15 MG/ML
15 INJECTION, SOLUTION INTRAMUSCULAR; INTRAVENOUS EVERY 6 HOURS PRN
Status: CANCELLED | OUTPATIENT
Start: 2021-03-04 | End: 2021-03-06

## 2021-03-04 RX ORDER — AMIODARONE HCL/D5W 450 MG/250
0.5 PLASTIC BAG, INJECTION (ML) INTRAVENOUS CONTINUOUS
Status: DISCONTINUED | OUTPATIENT
Start: 2021-03-04 | End: 2021-03-05 | Stop reason: HOSPADM

## 2021-03-04 RX ORDER — SODIUM CHLORIDE 0.9 % (FLUSH) 0.9 %
3 SYRINGE (ML) INJECTION EVERY 12 HOURS SCHEDULED
Status: CANCELLED | OUTPATIENT
Start: 2021-03-04

## 2021-03-04 RX ADMIN — Medication 1000 UNITS: at 13:30

## 2021-03-04 RX ADMIN — SODIUM CHLORIDE 5 MG/HR: 900 INJECTION, SOLUTION INTRAVENOUS at 17:48

## 2021-03-04 RX ADMIN — AMIODARONE HYDROCHLORIDE 0.5 MG/MIN: 50 INJECTION, SOLUTION INTRAVENOUS at 23:24

## 2021-03-04 RX ADMIN — ENOXAPARIN SODIUM 110 MG: 120 INJECTION SUBCUTANEOUS at 14:45

## 2021-03-04 RX ADMIN — SODIUM CHLORIDE 10 MG/HR: 900 INJECTION, SOLUTION INTRAVENOUS at 14:40

## 2021-03-04 RX ADMIN — TECHNETIUM TC 99M SESTAMIBI 1 DOSE: 1 INJECTION INTRAVENOUS at 09:45

## 2021-03-04 RX ADMIN — TECHNETIUM TC 99M SESTAMIBI 1 DOSE: 1 INJECTION INTRAVENOUS at 12:25

## 2021-03-04 RX ADMIN — KETOROLAC TROMETHAMINE 15 MG: 15 INJECTION, SOLUTION INTRAMUSCULAR; INTRAVENOUS at 14:46

## 2021-03-04 RX ADMIN — REGADENOSON 0.4 MG: 0.08 INJECTION, SOLUTION INTRAVENOUS at 12:25

## 2021-03-04 RX ADMIN — AMIODARONE HYDROCHLORIDE 1 MG/MIN: 50 INJECTION, SOLUTION INTRAVENOUS at 17:39

## 2021-03-04 RX ADMIN — AMIODARONE HYDROCHLORIDE 150 MG: 1.5 INJECTION, SOLUTION INTRAVENOUS at 17:30

## 2021-03-04 RX ADMIN — ENOXAPARIN SODIUM 110 MG: 120 INJECTION SUBCUTANEOUS at 04:06

## 2021-03-04 RX ADMIN — SERTRALINE HYDROCHLORIDE 100 MG: 100 TABLET ORAL at 13:31

## 2021-03-04 NOTE — ED NOTES
Patient reports laying in bed tonight and felt his heart begin racing. Denies SOA or pain, reports some feeling of lightheaded. Denies any cardiac history.     Teresa Calvo LPN  03/03/21 0900

## 2021-03-04 NOTE — CONSULTS
Cardiology Consult Note      REQUESTING PHYSICIAN    Morris Jefferson,*    PATIENT IDENTIFICATION  Name: Ashkan Farley  Age: 45 y.o.  Sex: male  :  1975  MRN: [id number]             REASON FOR CONSULTATION:  45-year-old male with no known history of ischemic heart disease.  He is on no home medications with exception of sertraline for depression.  He does not see PCP regularly.      CC:  Palpitations    HISTORY OF PRESENT ILLNESS:   Patient presented to [facility] 3/3/2021 with report of palpitations that began approximately 1 hour prior.  He reports associated diaphoresis.  He denies any actual chest pain, pressure, tightness.  He denies any shortness of breath, lower extremity edema, dizziness or lightheadedness.  Patient did state he has had palpitations in the past, thought to be due to anxiety.  Hence he was started on sertraline.    Patient was given 20 mg bolus of IV Cardizem and started on Cardizem drip.  He is on treatment dose Lovenox.  He was also started on full dose aspirin.  TSH 5.22, troponin negative x2, D-dimer negative.  2D echocardiogram and stress testing were ordered.  In nuclear stress lab, patient's heart rate 111-115.  After Lexiscan administered, heart rate elevated to 181.  He was relatively asymptomatic.  Patient was given 2.5 mg IV Lopressor with improvement in heart rate.  He remained in A. fib.  Patient reports father has history of A. fib as well.    Nuclear stress test performed and demonstrated abnormal perfusion with stress involving the inferior wall suggestive of ischemia.  Risk benefits and options discussed.  Patient wishes to proceed with cardiac catheterization.      REVIEW OF SYSTEMS:  Pertinent items are noted in HPI, all other systems reviewed and negative    OBJECTIVE       ASSESSMENT/PLAN  New onset atrial fibrillation with rapid ventricular response  History depression  Abnormal myocardial perfusion scan suggestive inferior  "ischemia.    Recommendations:  Risk benefits and options discussed we will proceed with cardiac catheterization.  2D echocardiogram pending  Chads vas score is low.  Recommend full dose aspirin at the present time.      Vital Signs  Visit Vitals  /64 (BP Location: Left arm, Patient Position: Lying)   Pulse 74   Temp 97.7 °F (36.5 °C) (Oral)   Resp 16   Ht 185.4 cm (73\")   Wt 106 kg (233 lb 0.4 oz)   SpO2 99%   BMI 30.74 kg/m²     Oxygen Therapy  SpO2: 99 %  Pulse Oximetry Type: Continuous  Device (Oxygen Therapy): room air  Flowsheet Rows      First Filed Value   Admission Height  185.4 cm (73\") Documented at 03/03/2021 2257   Admission Weight  107 kg (236 lb 12.4 oz) Documented at 03/03/2021 2257        Intake & Output (last 3 days)       03/01 0701 - 03/02 0700 03/02 0701 - 03/03 0700 03/03 0701 - 03/04 0700 03/04 0701 - 03/05 0700    IV Piggyback   500     Total Intake(mL/kg)   500 (4.7)     Net   +500                 Lines, Drains & Airways    Active LDAs     Name:   Placement date:   Placement time:   Site:   Days:    Peripheral IV 03/03/21 2334 Right Antecubital   03/03/21 2334    Antecubital   less than 1                MEDICAL HISTORY    Past Medical History:   Diagnosis Date   • Depression         SURGICAL HISTORY    History reviewed. No pertinent surgical history.     FAMILY HISTORY    Family History   Problem Relation Age of Onset   • Heart disease Father    • Atrial fibrillation Father        SOCIAL HISTORY    Social History     Tobacco Use   • Smoking status: Never Smoker   • Smokeless tobacco: Never Used   Substance Use Topics   • Alcohol use: Not Currently        ALLERGIES    Allergies   Allergen Reactions   • Erythromycin GI Intolerance              /64 (BP Location: Left arm, Patient Position: Lying)   Pulse 74   Temp 97.7 °F (36.5 °C) (Oral)   Resp 16   Ht 185.4 cm (73\")   Wt 106 kg (233 lb 0.4 oz)   SpO2 99%   BMI 30.74 kg/m²   Intake/Output last 3 shifts:  I/O last 3 " completed shifts:  In: 500 [IV Piggyback:500]  Out: -   Intake/Output this shift:  No intake/output data recorded.    PHYSICAL EXAM:    General: Alert, cooperative, no distress, appears stated age  Head:  Normocephalic, atraumatic, mucous membranes moist  Eyes:  Conjunctiva/corneas clear, EOM's intact     Neck:  Supple,  no bruit  Lungs: Clear to auscultation bilaterally, no wheezes rhonchi rales are noted  Chest wall: No tenderness  Heart::  Irregularly irregular rate and rhythm, S1 and S2 normal, no murmur, rub or gallop  Abdomen: Soft, non-tender, nondistended bowel sounds active  Extremities: No cyanosis, clubbing, or edema   Pulses: 2+ and symmetric all extremities  Skin:  No rashes or lesions  Neuro/psych: A&O x3. CN II through XII are grossly intact with appropriate affect      Scheduled Meds:      cholecalciferol, 1,000 Units, Oral, Daily  enoxaparin, 1 mg/kg, Subcutaneous, Q12H  sertraline, 100 mg, Oral, Daily        Continuous Infusions:    dilTIAZem, 10 mg/hr, Last Rate: 5 mg/hr (03/04/21 0504)  Pharmacy to Dose enoxaparin (LOVENOX),         PRN Meds:    •  acetaminophen **OR** acetaminophen **OR** acetaminophen  •  aluminum-magnesium hydroxide-simethicone  •  bisacodyl  •  ketorolac  •  magnesium sulfate **OR** magnesium sulfate **OR** magnesium sulfate  •  melatonin  •  nitroglycerin  •  ondansetron **OR** ondansetron  •  Pharmacy to Dose enoxaparin (LOVENOX)  •  potassium chloride  •  potassium chloride        Results Review:     I reviewed the patient's new clinical results.    CBC    Results from last 7 days   Lab Units 03/04/21  0556 03/03/21  2350   WBC 10*3/mm3 3.90 4.40   HEMOGLOBIN g/dL 14.5 14.7   PLATELETS 10*3/mm3 219 211     Cr Clearance Estimated Creatinine Clearance: 115.9 mL/min (by C-G formula based on SCr of 1.05 mg/dL).  Coag   Results from last 7 days   Lab Units 03/03/21  2350   INR  0.97   APTT seconds 25.1     HbA1C   Lab Results   Component Value Date    HGBA1C 5.1 07/09/2020      Blood Glucose No results found for: POCGLU  Infection     CMP   Results from last 7 days   Lab Units 03/04/21  0556 03/03/21  2350   SODIUM mmol/L 141 138   POTASSIUM mmol/L 5.0 3.7   CHLORIDE mmol/L 106 100   CO2 mmol/L 26.0 22.0   BUN mg/dL 20 21*   CREATININE mg/dL 1.05 1.14   GLUCOSE mg/dL 87 92   ALBUMIN g/dL  --  4.50   BILIRUBIN mg/dL  --  0.3   ALK PHOS U/L  --  55   AST (SGOT) U/L  --  25   ALT (SGPT) U/L  --  21     ABG      UA      ROSELYN  No results found for: POCMETH, POCAMPHET, POCBARBITUR, POCBENZO, POCCOCAINE, POCOPIATES, POCOXYCODO, POCPHENCYC, POCPROPOXY, POCTHC, POCTRICYC  Lysis Labs   Results from last 7 days   Lab Units 03/04/21  0556 03/03/21  2350   INR   --  0.97   APTT seconds  --  25.1   HEMOGLOBIN g/dL 14.5 14.7   PLATELETS 10*3/mm3 219 211   CREATININE mg/dL 1.05 1.14     Radiology(recent) Xr Chest 1 View    Result Date: 3/4/2021  No acute cardiopulmonary abnormality.  Electronically Signed By-Sherri Hurst MD On:3/4/2021 7:39 AM This report was finalized on [id number] by  Sherri Hurst MD.        Results from last 7 days   Lab Units 03/04/21  0556   TROPONIN T ng/mL <0.010       Xrays, labs reviewed personally by physician.    ECG/EMG Results (most recent)     Procedure Component Value Units Date/Time    ECG 12 Lead [[id number]] Collected: 03/03/21 2303     Updated: 03/03/21 2305     QT Interval 310 ms     Narrative:      HEART RATE= 134  bpm  RR Interval= 446  ms  NM Interval=   ms  P Horizontal Axis=   deg  P Front Axis=   deg  QRSD Interval= 89  ms  QT Interval= 310  ms  QRS Axis= 28  deg  T Wave Axis= 14  deg  - ABNORMAL ECG -  Atrial fibrillation  Ventricular premature complex  Borderline ST depression, diffuse leads  Electronically Signed By:   Date and Time of Study: 2021-03-03 23:03:36    ECG 12 Lead [[id number]] Collected: 03/04/21 0405     Updated: 03/04/21 0408     QT Interval 383 ms     Narrative:      HEART RATE= 65  bpm  RR Interval= 925  ms  NM Interval=   ms  P Horizontal  Axis=   deg  P Front Axis=   deg  QRSD Interval= 90  ms  QT Interval= 383  ms  QRS Axis= 43  deg  T Wave Axis= 33  deg  - ABNORMAL ECG -  Atrial fibrillation  ST elev, probable normal early repol pattern  When compared with ECG of 03-Mar-2021 23:03:36,  Significant rate decrease  Significant repolarization change  Electronically Signed By:   Date and Time of Study: 2021-03-04 04:05:32            Medication Review:   I have reviewed the patient's current medication list  Scheduled Meds:cholecalciferol, 1,000 Units, Oral, Daily  enoxaparin, 1 mg/kg, Subcutaneous, Q12H  sertraline, 100 mg, Oral, Daily      Continuous Infusions:dilTIAZem, 10 mg/hr, Last Rate: 5 mg/hr (03/04/21 0504)  Pharmacy to Dose enoxaparin (LOVENOX),       PRN Meds:.•  acetaminophen **OR** acetaminophen **OR** acetaminophen  •  aluminum-magnesium hydroxide-simethicone  •  bisacodyl  •  ketorolac  •  magnesium sulfate **OR** magnesium sulfate **OR** magnesium sulfate  •  melatonin  •  nitroglycerin  •  ondansetron **OR** ondansetron  •  Pharmacy to Dose enoxaparin (LOVENOX)  •  potassium chloride  •  potassium chloride    Imaging:  Imaging Results (Last 72 Hours)     Procedure Component Value Units Date/Time    XR Chest 1 View [[id number]] Collected: 03/04/21 0738     Updated: 03/04/21 0741    Narrative:      DATE OF EXAM:  3/3/2021 11:45 PM     PROCEDURE:  XR CHEST 1 VW-     INDICATIONS:  weakness new onset atrial fib     COMPARISON:  No Comparisons Available     TECHNIQUE:   Single radiographic view of the chest was obtained.     FINDINGS:  Lungs are normally expanded. Heart size is normal. No pneumothorax or  pleural effusion or focal pulmonary parenchymal opacity. Pulmonary  vessels are distinct. Bones and soft tissues are normal.       Impression:      No acute cardiopulmonary abnormality.     Electronically Signed By-Sherri Hurst MD On:3/4/2021 7:39 AM  This report was finalized on [id number] by  Sherri Hurst MD.            Arianne Tucker,  "SERENA  03/04/21  07:52 EST       EMR Dragon/Transcription:   \"Dictated utilizing Dragon dictation\".   Disclaimer: Please note that areas of this note were completed with computer voice recognition software.  Quite often unanticipated grammatical, syntax, homophones, and other interpretive errors are inadvertently transcribed by the computer software. Please excuse any errors that have escaped final proofreading    Electronically signed by SERENA Scales, 03/04/21, 7:52 AM EST.    Cardiology attending:  As above.  Agree with assessment plan.  Seen and examined.  Chart labs reviewed.  Note scribed by APC and reviewed for accuracy with above changes.  Nuclear stress test abnormal.  Patient denies chest pain.  Reports palpitations.  Heart rate better controlled.  Risk benefits and options discussed.  We will proceed with cardiac catheterization.  Recommend full dose aspirin as CHADS2 score is low.    "

## 2021-03-04 NOTE — H&P
[facility] Medicine Services      Patient Name: Ashkan Farley  : 1975  MRN: [id number]  Primary Care Physician: Brando Piper MD  Date of admission: 3/3/2021    Patient Care Team:  Brando Piper MD as PCP - General (Family Medicine)          Subjective   History Present Illness     Chief Complaint:   Chief Complaint   Patient presents with   • Palpitations     Chief complaint: Palpitations    History of present illness:    Patient is a 45-year-old male that reports to [facility] ED with acute onset of severe palpitations within the last hour.  Patient reports feeling diaphoretic but denies chest pain.  When patient checked  pulse it was greater than 150.  Patient denies any recent fever, chills, or trauma.  No aggravating or alleviating factors were noted.  Patient reports his last visit to his PCP was greater than 2 years prior.  Patient reports no home medications with the exception of sertraline for depression.  Patient reports his father has atrial fibrillation.  Patient denies any other significant medical history, but does feel like he may have had high blood pressure and high cholesterol in the past.  Upon arrival to the ED he was in atrial fibrillation with PVCs and borderline ST depression, 20 mg push was given and IV drip was started.  Patient denies use of energy drinks, increased caffeine intake or illegal drug use.             History of Present Illness    Review of Systems   Constitution: Positive for diaphoresis. Negative for chills, decreased appetite and fever.   HENT: Negative.    Eyes: Negative for photophobia and visual disturbance.   Cardiovascular: Positive for irregular heartbeat and palpitations. Negative for chest pain, dyspnea on exertion, leg swelling and syncope.   Respiratory: Negative.  Negative for cough and shortness of breath.    Endocrine: Negative.    Hematologic/Lymphatic: Negative.    Skin: Negative.  Negative for dry skin and  flushing.   Musculoskeletal: Negative.  Negative for back pain, falls and myalgias.   Gastrointestinal: Negative.  Negative for jaundice, melena and nausea.   Genitourinary: Negative.  Negative for dysuria and hesitancy.   Neurological: Negative.  Negative for dizziness and headaches.   Psychiatric/Behavioral: Negative.    Allergic/Immunologic: Negative.    All other systems reviewed and are negative.          Personal History     Past Medical History:   Past Medical History:   Diagnosis Date   • Depression        Surgical History:    History reviewed. No pertinent surgical history.        Family History: family history includes Atrial fibrillation in his father; Heart disease in his father. Otherwise pertinent FHx was reviewed and unremarkable.     Social History:  reports that he has never smoked. He has never used smokeless tobacco. He reports previous alcohol use. He reports that he does not use drugs.      Medications:  Prior to Admission medications    Medication Sig Start Date End Date Taking? Authorizing Provider   cholecalciferol (Cholecalciferol) 25 MCG (1000 UT) tablet Take 1,000 Units by mouth Daily.   Yes Provider, MD Kong   sertraline (ZOLOFT) 100 MG tablet Take 1 tablet by mouth Daily. 8/3/20  Yes Brando Piper MD   vitamin D (ERGOCALCIFEROL) 1.25 MG (54712 UT) capsule capsule Take 1 capsule by mouth 1 (One) Time Per Week. 8/3/20 3/4/21  Brando Piper MD       Allergies:    Allergies   Allergen Reactions   • Erythromycin GI Intolerance       Objective   Objective     Vital Signs  Temp:  [97.8 °F (36.6 °C)] 97.8 °F (36.6 °C)  Heart Rate:  [] 67  Resp:  [15-17] 17  BP: ()/(60-86) 108/78  SpO2:  [95 %-100 %] 100 %  on   ;   Device (Oxygen Therapy): room air  Body mass index is 29.99 kg/m².    Physical Exam  Vitals signs and nursing note reviewed.   Constitutional:       General: He is not in acute distress.     Appearance: Normal appearance. He is obese. He is not  ill-appearing.   HENT:      Right Ear: External ear normal.      Left Ear: External ear normal.      Nose: Nose normal.      Mouth/Throat:      Mouth: Mucous membranes are dry.   Eyes:      Extraocular Movements: Extraocular movements intact.      Pupils: Pupils are equal, round, and reactive to light.   Neck:      Musculoskeletal: Normal range of motion and neck supple. No neck rigidity or muscular tenderness.   Cardiovascular:      Rate and Rhythm: Tachycardia present. Rhythm regularly irregular.      Heart sounds: Heart sounds are distant.   Pulmonary:      Effort: Pulmonary effort is normal.      Breath sounds: Normal breath sounds.   Abdominal:      General: Bowel sounds are normal.      Palpations: Abdomen is soft.      Tenderness: There is no abdominal tenderness.      Hernia: No hernia is present.   Musculoskeletal: Normal range of motion.      Right lower leg: No edema.      Left lower leg: No edema.   Skin:     General: Skin is warm and dry.      Coloration: Skin is not jaundiced.      Findings: No bruising.   Neurological:      General: No focal deficit present.      Mental Status: He is alert and oriented to person, place, and time. Mental status is at baseline.   Psychiatric:         Mood and Affect: Mood normal.         Behavior: Behavior normal.         Thought Content: Thought content normal.         Judgment: Judgment normal.           Results Review:  I have personally reviewed most recent cardiac tracings and radiographic results and agree with findings.    Results from last 7 days   Lab Units 03/03/21  2350   WBC 10*3/mm3 4.40   HEMOGLOBIN g/dL 14.7   HEMATOCRIT % 43.0   PLATELETS 10*3/mm3 211   INR  0.97     Results from last 7 days   Lab Units 03/03/21  2350   SODIUM mmol/L 138   POTASSIUM mmol/L 3.7   CHLORIDE mmol/L 100   CO2 mmol/L 22.0   BUN mg/dL 21*   CREATININE mg/dL 1.14   GLUCOSE mg/dL 92   CALCIUM mg/dL 9.1   ALT (SGPT) U/L 21   AST (SGOT) U/L 25   TROPONIN T ng/mL <0.010          Estimated Creatinine Clearance: 105.3 mL/min (by C-G formula based on SCr of 1.14 mg/dL).  Brief Urine Lab Results  (Last result in the past 365 days)      Color   Clarity   Blood   Leuk Est   Nitrite   Protein   CREAT   Urine HCG        07/09/20 0926 Yellow Turbid Negative Negative Negative Negative               Microbiology Results (last 10 days)     ** No results found for the last 240 hours. **        ECG 12 Lead   Preliminary Result   HEART RATE= 65  bpm   RR Interval= 925  ms   MD Interval=   ms   P Horizontal Axis=   deg   P Front Axis=   deg   QRSD Interval= 90  ms   QT Interval= 383  ms   QRS Axis= 43  deg   T Wave Axis= 33  deg   - ABNORMAL ECG -   Atrial fibrillation   ST elev, probable normal early repol pattern   When compared with ECG of 03-Mar-2021 23:03:36,   Significant rate decrease   Significant repolarization change   Electronically Signed By:    Date and Time of Study: 2021-03-04 04:05:32      ECG 12 Lead   Preliminary Result   HEART RATE= 134  bpm   RR Interval= 446  ms   MD Interval=   ms   P Horizontal Axis=   deg   P Front Axis=   deg   QRSD Interval= 89  ms   QT Interval= 310  ms   QRS Axis= 28  deg   T Wave Axis= 14  deg   - ABNORMAL ECG -   Atrial fibrillation   Ventricular premature complex   Borderline ST depression, diffuse leads   Electronically Signed By:    Date and Time of Study: 2021-03-03 23:03:36      ECG 12 Lead    (Results Pending)       ECG/EMG Results (most recent)     Procedure Component Value Units Date/Time    ECG 12 Lead [[id number]] Collected: 03/03/21 2303     Updated: 03/03/21 2305     QT Interval 310 ms     Narrative:      HEART RATE= 134  bpm  RR Interval= 446  ms  MD Interval=   ms  P Horizontal Axis=   deg  P Front Axis=   deg  QRSD Interval= 89  ms  QT Interval= 310  ms  QRS Axis= 28  deg  T Wave Axis= 14  deg  - ABNORMAL ECG -  Atrial fibrillation  Ventricular premature complex  Borderline ST depression, diffuse leads  Electronically Signed By:   Date and  Time of Study: 2021-03-03 23:03:36    ECG 12 Lead [[id number]] Collected: 03/04/21 0405     Updated: 03/04/21 0408     QT Interval 383 ms     Narrative:      HEART RATE= 65  bpm  RR Interval= 925  ms  UT Interval=   ms  P Horizontal Axis=   deg  P Front Axis=   deg  QRSD Interval= 90  ms  QT Interval= 383  ms  QRS Axis= 43  deg  T Wave Axis= 33  deg  - ABNORMAL ECG -  Atrial fibrillation  ST elev, probable normal early repol pattern  When compared with ECG of 03-Mar-2021 23:03:36,  Significant rate decrease  Significant repolarization change  Electronically Signed By:   Date and Time of Study: 2021-03-04 04:05:32                    No radiology results for the last 7 days      Estimated Creatinine Clearance: 105.3 mL/min (by C-G formula based on SCr of 1.14 mg/dL).    Assessment/Plan   Assessment/Plan       Active Hospital Problems    Diagnosis  POA   • **New onset atrial fibrillation (CMS/HCC) [I48.91]  Yes     Priority: High   • Depression [F32.9]  Yes      Resolved Hospital Problems   No resolved problems to display.       New onset atrial fibrillation with RVR:  -20 mg Cardizem IV push,  mg given in the ED  -IV Cardizem drip started in the ED, currently running at 5 mg-hour  -Initial troponin negative, trend troponins every 6 hours  -Check TSH level  -Check D-dimer  -Echocardiogram ordered  -N.p.o. status in case stress test is indicated in the a.m.  -Consider cardiology consult  -500 mL normal saline bolus given in the ED  -Continue IV Cardizem drip to maintain heart rate less than 120  -Check lipid panel and A1c  -Electrolyte protocol  -Serial EKGs  -Pharmacy to dose therapeutic dose of Lovenox    Depression:  -Reorder home Zoloft 100 mg p.o. daily    VTE Prophylaxis -   Mechanical Order History:     None      Pharmalogical Order History:     None          CODE STATUS:    Code Status and Medical Interventions:   Ordered at: 03/04/21 0211     Level Of Support Discussed With:    Patient     Code Status:     CPR     Medical Interventions (Level of Support Prior to Arrest):    Full       This patient has been Interviewed wearing appropriate personal protective equipment and findings discussed with the ED physician.    I discussed the patient's findings and my recommendations with the patient.    Signature:Electronically signed by SERENA Garcia, 03/04/21, 4:42 AM JANELLE      [facility] Hospitalist Team    Attending attestation:    I performed a history and physical examination of the patient. I reviewed the nurse practitioner's note and agree with the documented findings and plan of care unless otherwise documented below.    S:    Patient is a 45-year-old male presenting for evaluation of sudden onset of palpitations beginning overnight.  Patient reports having previous episodes of similar symptoms but less intense and patient reported feeling more fatigued lately.  Patient appeared to be in atrial fibrillation in the emergency department with RVR and started on Cardizem with good rate control.    O:    Vital signs reviewed    General: Obese middle-age male lying in bed breathing comfortably on room air, no acute distress  HEENT: NC/AT, EOMI, mucosa moist  Heart: Irregular, rate controlled  Chest: Normal work of breathing, moving air well no wheezing or crackles  Abdominal: Soft. NT/ND.   Musculoskeletal: Normal ROM.  No edema. No calf tenderness.  Neurological: AAOx3, no focal deficits  Skin: Skin is warm and dry. No rash  Psychiatric: Normal mood and affect.    A/P    Palpitations-maximum heart rate in the 130s on admission appeared to be atrial fibrillation responded to calcium channel blockers and has since been in control  -Ischemic work-up  -Cardiology consulted  -Risk factor work-up  -Telemetry  -Normal D-dimer  -EKG rate controlled atrial fibrillation  -Echo  -Discussion of oral rate control medication and anticoagulation versus ablation if candidate    Hyperlipidemia-patient with   -Consider  statin    Elevated TSH-mild, may be stress response versus true hypothyroidism  -Check free T4    Depression-patient on chronic medication  -Continue    Obesity-BMI of 30  -Lifestyle modifications    Electronically signed by Morris Jefferson MD, 03/04/21, 1:39 PM EST.

## 2021-03-04 NOTE — PLAN OF CARE
Goal Outcome Evaluation:  Plan of Care Reviewed With: patient  Progress: no change  Outcome Summary: Patient remains in Afib. Cardizem gtt stopped per cardiology. Echocardiogram and stress test today. Patient stable, will continue to monitor while awaiting results.

## 2021-03-04 NOTE — PROGRESS NOTES
Continued Stay Note  [facility]     Patient Name: Ashkan Farley  MRN: [id number]  Today's Date: 3/4/2021    Admit Date: 3/3/2021    Discharge Plan     Row Name 03/04/21 1432       Plan    Patient/Family in Agreement with Plan  unable to assess    Plan Comments  Unable to case manage at this time. CM attempted to meet with patient at bedside to discuss dc planning, however he was off the floor having a myoview. CM will f/u at later time.        Phone communication or documentation only - no physical contact with patient or family.    Lulu Sue RN     Office Phone: [phone]  Office Cell: [phone]

## 2021-03-04 NOTE — ED PROVIDER NOTES
Subjective   45-year-old male realized he was having irregular heartbeat and palpitations as he was going to bed this evening.  He states that he has had palpitations occasionally in the past but this was sustained.  He took his pulse and it was about 150.  He reports no exertional chest pain or recent change in exercise tolerance.  He reports that he does not use excessive amounts of caffeine and reports no history of thyroid problems.  He reports that he has been using a ketone-based diet for the last couple of weeks.  He reports that he has had no nausea vomiting or diarrhea.  He denies diaphoresis.  He denies orthopnea.  He states that the sensation has been in place for about 45 minutes prior to arrival          Review of Systems   Constitutional: Positive for fatigue. Negative for chills and diaphoresis.   Respiratory: Negative for shortness of breath.    Cardiovascular: Positive for palpitations. Negative for chest pain and leg swelling.   Gastrointestinal: Negative for nausea.   Endocrine: Negative for polydipsia, polyphagia and polyuria.   Hematological: Does not bruise/bleed easily.   All other systems reviewed and are negative.      No past medical history on file.    Allergies   Allergen Reactions   • Erythromycin GI Intolerance       No past surgical history on file.    No family history on file.    Social History     Socioeconomic History   • Marital status:      Spouse name: Not on file   • Number of children: Not on file   • Years of education: Not on file   • Highest education level: Not on file       No recent unusual activity.  He states that recently he helped a neighbor and was noted to be a little short of breath    Objective   Physical Exam  Alert Chery Coma Scale 15   HEENT: Pupils equal and reactive to light. Conjunctivae are not injected. normal tympanic membranes. Oropharynx and nares are normal.   Neck: Supple. Midline trachea. No JVD. No goiter.   Chest: Clear and equal breath  sounds bilaterally irregular rate and rhythm without murmur or rub.  The patient's underlying rhythm is atrial fib with an occasional fusion beat   Abdomen: Positive bowel sounds nontender nondistended. No rebound or peritoneal signs. No CVA tenderness.   Extremities no clubbing cyanosis or edema motor sensory exam is normal the full range of motion is intact   skin: Warm and dry, no rashes or petechia.   Lymphatic: No regional lymphadenopathy. No calf pain, swelling or Chan's sign    Procedures           ED Course  ED Course as of Mar 04 0124   Thu Mar 04, 2021   0100 I examined the patient using the appropriate personal protective equipment.          [TH]      ED Course User Index  [TH] Griffin Warren MD                                 Labs Reviewed   COMPREHENSIVE METABOLIC PANEL - Abnormal; Notable for the following components:       Result Value    BUN 21 (*)     Anion Gap 16.0 (*)     All other components within normal limits    Narrative:     GFR Normal >60  Chronic Kidney Disease <60  Kidney Failure <15     PROTIME-INR - Normal   APTT - Normal   TROPONIN (IN-HOUSE) - Normal    Narrative:     Troponin T Reference Range:  <= 0.03 ng/mL-   Negative for AMI  >0.03 ng/mL-     Abnormal for myocardial necrosis.  Clinicians would have to utilize clinical acumen, EKG, Troponin and serial changes to determine if it is an Acute Myocardial Infarction or myocardial injury due to an underlying chronic condition.       Results may be falsely decreased if patient taking Biotin.     CBC WITH AUTO DIFFERENTIAL - Normal   COVID PRE-OP / PRE-PROCEDURE SCREENING ORDER (NO ISOLATION)    Narrative:     The following orders were created for panel order COVID PRE-OP / PRE-PROCEDURE SCREENING ORDER (NO ISOLATION) - Swab, Nasopharynx.  Procedure                               Abnormality         Status                     ---------                               -----------         ------                     COVID-19,APTIMA  PANTHER,...[[id number]]                                                   Please view results for these tests on the individual orders.   COVID-19,APTIMA PANTHER,JESSICA IN-HOUSE,NP/OP SWAB IN UTM/VTM/SALINE TRANSPORT MEDIA,24 HR TAT   TSH   CBC AND DIFFERENTIAL    Narrative:     The following orders were created for panel order CBC & Differential.  Procedure                               Abnormality         Status                     ---------                               -----------         ------                     CBC Auto Differential[[id number]]        Normal              Final result                 Please view results for these tests on the individual orders.   EXTRA TUBES    Narrative:     The following orders were created for panel order Extra Tubes.  Procedure                               Abnormality         Status                     ---------                               -----------         ------                     Gold Top - SST[[id number]]                                   Final result                 Please view results for these tests on the individual orders.   GOLD TOP - SST     Medications   dilTIAZem (CARDIZEM) 100 mg in 100 mL NS infusion (ADV) (5 mg/hr Intravenous Rate/Dose Change 3/4/21 0054)   sodium chloride 0.9 % bolus 500 mL (0 mL Intravenous Stopped 3/4/21 0054)   dilTIAZem (CARDIZEM) injection 20 mg (20 mg Intravenous Given 3/3/21 2350)   aspirin tablet 325 mg (325 mg Oral Given 3/3/21 2349)     Chest x-ray does not show cardiomegaly          MDM  Number of Diagnoses or Management Options     Amount and/or Complexity of Data Reviewed  Clinical lab tests: reviewed  Tests in the radiology section of CPT®: reviewed  Tests in the medicine section of CPT®: reviewed  Independent visualization of images, tracings, or specimens: yes    Risk of Complications, Morbidity, and/or Mortality  Presenting problems: high  Diagnostic procedures: high  Management options: high  General comments: The  patient's rate was controlled into the 75-85 range.  At time of dictation the patient was having an occasional sinus beat and the PVC appearing fusion beats were also noted.  The patient stated he felt much better.  The patient case was discussed the hospitalist practitioner patient will be admitted and will need cardiology evaluation, serial troponin, echocardiogram.  The patient was agreeable to this plan of treatment        Final diagnoses:   New onset atrial fibrillation (CMS/HCC)   Palpitations            Griffin Warren MD  03/04/21 0125

## 2021-03-05 ENCOUNTER — READMISSION MANAGEMENT (OUTPATIENT)
Dept: CALL CENTER | Facility: HOSPITAL | Age: 46
End: 2021-03-05

## 2021-03-05 VITALS
BODY MASS INDEX: 30.71 KG/M2 | RESPIRATION RATE: 14 BRPM | OXYGEN SATURATION: 98 % | HEART RATE: 62 BPM | TEMPERATURE: 97.7 F | HEIGHT: 73 IN | SYSTOLIC BLOOD PRESSURE: 123 MMHG | DIASTOLIC BLOOD PRESSURE: 70 MMHG | WEIGHT: 231.7 LBS

## 2021-03-05 DIAGNOSIS — I48.91 NEW ONSET ATRIAL FIBRILLATION (HCC): Primary | ICD-10-CM

## 2021-03-05 LAB
ANION GAP SERPL CALCULATED.3IONS-SCNC: 16 MMOL/L (ref 5–15)
APTT PPP: 24.9 SECONDS (ref 24–31)
BASOPHILS # BLD AUTO: 0 10*3/MM3 (ref 0–0.2)
BASOPHILS NFR BLD AUTO: 0.5 % (ref 0–1.5)
BH CV ECHO MEAS - ACS: 2.3 CM
BH CV ECHO MEAS - AO MAX PG (FULL): 2.2 MMHG
BH CV ECHO MEAS - AO MAX PG: 4.9 MMHG
BH CV ECHO MEAS - AO MEAN PG (FULL): 1.1 MMHG
BH CV ECHO MEAS - AO MEAN PG: 2.6 MMHG
BH CV ECHO MEAS - AO ROOT AREA (BSA CORRECTED): 2
BH CV ECHO MEAS - AO ROOT AREA: 12.1 CM^2
BH CV ECHO MEAS - AO ROOT DIAM: 3.9 CM
BH CV ECHO MEAS - AO V2 MAX: 110.5 CM/SEC
BH CV ECHO MEAS - AO V2 MEAN: 76.1 CM/SEC
BH CV ECHO MEAS - AO V2 VTI: 19.3 CM
BH CV ECHO MEAS - ASC AORTA: 3.3 CM
BH CV ECHO MEAS - AVA(I,A): 3.2 CM^2
BH CV ECHO MEAS - AVA(I,D): 3.2 CM^2
BH CV ECHO MEAS - AVA(V,A): 3.2 CM^2
BH CV ECHO MEAS - AVA(V,D): 3.2 CM^2
BH CV ECHO MEAS - BSA(HAYCOCK): 2 M^2
BH CV ECHO MEAS - BSA: 2 M^2
BH CV ECHO MEAS - BZI_BMI: 22 KILOGRAMS/M^2
BH CV ECHO MEAS - BZI_METRIC_HEIGHT: 185.4 CM
BH CV ECHO MEAS - BZI_METRIC_WEIGHT: 75.7 KG
BH CV ECHO MEAS - EDV(CUBED): 85 ML
BH CV ECHO MEAS - EDV(MOD-SP4): 73.6 ML
BH CV ECHO MEAS - EDV(TEICH): 87.5 ML
BH CV ECHO MEAS - EF(CUBED): 79.4 %
BH CV ECHO MEAS - EF(MOD-BP): 61 %
BH CV ECHO MEAS - EF(MOD-SP4): 61.1 %
BH CV ECHO MEAS - EF(TEICH): 72 %
BH CV ECHO MEAS - ESV(CUBED): 17.5 ML
BH CV ECHO MEAS - ESV(MOD-SP4): 28.7 ML
BH CV ECHO MEAS - ESV(TEICH): 24.5 ML
BH CV ECHO MEAS - FS: 41 %
BH CV ECHO MEAS - IVS/LVPW: 0.95
BH CV ECHO MEAS - IVSD: 1.1 CM
BH CV ECHO MEAS - LA DIMENSION(2D): 2.9 CM
BH CV ECHO MEAS - LV DIASTOLIC VOL/BSA (35-75): 36.9 ML/M^2
BH CV ECHO MEAS - LV MASS(C)D: 168.7 GRAMS
BH CV ECHO MEAS - LV MASS(C)DI: 84.6 GRAMS/M^2
BH CV ECHO MEAS - LV MAX PG: 2.7 MMHG
BH CV ECHO MEAS - LV MEAN PG: 1.5 MMHG
BH CV ECHO MEAS - LV SYSTOLIC VOL/BSA (12-30): 14.4 ML/M^2
BH CV ECHO MEAS - LV V1 MAX: 82.3 CM/SEC
BH CV ECHO MEAS - LV V1 MEAN: 56.5 CM/SEC
BH CV ECHO MEAS - LV V1 VTI: 14.3 CM
BH CV ECHO MEAS - LVIDD: 4.4 CM
BH CV ECHO MEAS - LVIDS: 2.6 CM
BH CV ECHO MEAS - LVOT AREA: 4.2 CM^2
BH CV ECHO MEAS - LVOT DIAM: 2.3 CM
BH CV ECHO MEAS - LVPWD: 1.1 CM
BH CV ECHO MEAS - MV E MAX VEL: 65.7 CM/SEC
BH CV ECHO MEAS - MV MAX PG: 3.2 MMHG
BH CV ECHO MEAS - MV MEAN PG: 1.3 MMHG
BH CV ECHO MEAS - MV V2 MAX: 89.1 CM/SEC
BH CV ECHO MEAS - MV V2 MEAN: 53.2 CM/SEC
BH CV ECHO MEAS - MV V2 VTI: 14.2 CM
BH CV ECHO MEAS - MVA(VTI): 4.3 CM^2
BH CV ECHO MEAS - PA ACC TIME: 0.13 SEC
BH CV ECHO MEAS - PA MAX PG (FULL): 0.34 MMHG
BH CV ECHO MEAS - PA MAX PG: 2.4 MMHG
BH CV ECHO MEAS - PA PR(ACCEL): 20.9 MMHG
BH CV ECHO MEAS - PA V2 MAX: 77.8 CM/SEC
BH CV ECHO MEAS - RAP SYSTOLE: 3 MMHG
BH CV ECHO MEAS - RV MAX PG: 2.1 MMHG
BH CV ECHO MEAS - RV MEAN PG: 1 MMHG
BH CV ECHO MEAS - RV V1 MAX: 72.1 CM/SEC
BH CV ECHO MEAS - RV V1 MEAN: 46.8 CM/SEC
BH CV ECHO MEAS - RV V1 VTI: 13.4 CM
BH CV ECHO MEAS - RVDD: 3.7 CM
BH CV ECHO MEAS - RVSP: 12.8 MMHG
BH CV ECHO MEAS - SI(AO): 116.8 ML/M^2
BH CV ECHO MEAS - SI(CUBED): 33.9 ML/M^2
BH CV ECHO MEAS - SI(LVOT): 30.6 ML/M^2
BH CV ECHO MEAS - SI(MOD-SP4): 22.6 ML/M^2
BH CV ECHO MEAS - SI(TEICH): 31.6 ML/M^2
BH CV ECHO MEAS - SV(AO): 232.9 ML
BH CV ECHO MEAS - SV(CUBED): 67.5 ML
BH CV ECHO MEAS - SV(LVOT): 61 ML
BH CV ECHO MEAS - SV(MOD-SP4): 45 ML
BH CV ECHO MEAS - SV(TEICH): 63 ML
BH CV ECHO MEAS - TR MAX VEL: 156.8 CM/SEC
BH CV NUCLEAR PRIOR STUDY: 3
BH CV STRESS BP STAGE 1: NORMAL
BH CV STRESS BP STAGE 2: NORMAL
BH CV STRESS BP STAGE 3: NORMAL
BH CV STRESS BP STAGE 4: NORMAL
BH CV STRESS COMMENTS STAGE 1: NORMAL
BH CV STRESS COMMENTS STAGE 2: NORMAL
BH CV STRESS DOSE REGADENOSON STAGE 1: 0.4
BH CV STRESS DURATION MIN STAGE 1: 0
BH CV STRESS DURATION MIN STAGE 2: 4
BH CV STRESS DURATION SEC STAGE 1: 10
BH CV STRESS DURATION SEC STAGE 2: 0
BH CV STRESS HR STAGE 1: 146
BH CV STRESS HR STAGE 2: 140
BH CV STRESS HR STAGE 3: 154
BH CV STRESS HR STAGE 4: 155
BH CV STRESS PROTOCOL 1: NORMAL
BH CV STRESS RECOVERY BP: NORMAL MMHG
BH CV STRESS RECOVERY HR: 93 BPM
BH CV STRESS STAGE 1: 1
BH CV STRESS STAGE 2: 2
BH CV STRESS STAGE 3: 3
BH CV STRESS STAGE 4: 4
BUN SERPL-MCNC: 23 MG/DL (ref 6–20)
BUN/CREAT SERPL: 20.7 (ref 7–25)
CALCIUM SPEC-SCNC: 9.7 MG/DL (ref 8.6–10.5)
CHLORIDE SERPL-SCNC: 101 MMOL/L (ref 98–107)
CO2 SERPL-SCNC: 20 MMOL/L (ref 22–29)
CREAT SERPL-MCNC: 1.11 MG/DL (ref 0.76–1.27)
DEPRECATED RDW RBC AUTO: 42 FL (ref 37–54)
EOSINOPHIL # BLD AUTO: 0.1 10*3/MM3 (ref 0–0.4)
EOSINOPHIL NFR BLD AUTO: 1.7 % (ref 0.3–6.2)
ERYTHROCYTE [DISTWIDTH] IN BLOOD BY AUTOMATED COUNT: 13.4 % (ref 12.3–15.4)
GFR SERPL CREATININE-BSD FRML MDRD: 72 ML/MIN/1.73
GLUCOSE SERPL-MCNC: 82 MG/DL (ref 65–99)
HCT VFR BLD AUTO: 42.3 % (ref 37.5–51)
HGB BLD-MCNC: 14.3 G/DL (ref 13–17.7)
INR PPP: 1 (ref 0.93–1.1)
LV EF NUC BP: 52 %
LYMPHOCYTES # BLD AUTO: 1 10*3/MM3 (ref 0.7–3.1)
LYMPHOCYTES NFR BLD AUTO: 24 % (ref 19.6–45.3)
MAGNESIUM SERPL-MCNC: 2 MG/DL (ref 1.6–2.6)
MAXIMAL PREDICTED HEART RATE: 177 BPM
MCH RBC QN AUTO: 30.8 PG (ref 26.6–33)
MCHC RBC AUTO-ENTMCNC: 33.9 G/DL (ref 31.5–35.7)
MCV RBC AUTO: 90.9 FL (ref 79–97)
MONOCYTES # BLD AUTO: 0.3 10*3/MM3 (ref 0.1–0.9)
MONOCYTES NFR BLD AUTO: 6.9 % (ref 5–12)
NEUTROPHILS NFR BLD AUTO: 2.9 10*3/MM3 (ref 1.7–7)
NEUTROPHILS NFR BLD AUTO: 66.9 % (ref 42.7–76)
NRBC BLD AUTO-RTO: 0.1 /100 WBC (ref 0–0.2)
PERCENT MAX PREDICTED HR: 87.57 %
PLATELET # BLD AUTO: 209 10*3/MM3 (ref 140–450)
PMV BLD AUTO: 8.1 FL (ref 6–12)
POTASSIUM SERPL-SCNC: 4.6 MMOL/L (ref 3.5–5.2)
PROTHROMBIN TIME: 11 SECONDS (ref 9.6–11.7)
RBC # BLD AUTO: 4.65 10*6/MM3 (ref 4.14–5.8)
SODIUM SERPL-SCNC: 137 MMOL/L (ref 136–145)
STRESS BASELINE BP: NORMAL MMHG
STRESS BASELINE HR: 99 BPM
STRESS PERCENT HR: 103 %
STRESS POST PEAK BP: NORMAL MMHG
STRESS POST PEAK HR: 155 BPM
STRESS TARGET HR: 150 BPM
WBC # BLD AUTO: 4.3 10*3/MM3 (ref 3.4–10.8)

## 2021-03-05 PROCEDURE — 83735 ASSAY OF MAGNESIUM: CPT | Performed by: NURSE PRACTITIONER

## 2021-03-05 PROCEDURE — C1769 GUIDE WIRE: HCPCS | Performed by: INTERNAL MEDICINE

## 2021-03-05 PROCEDURE — 25010000002 FENTANYL CITRATE (PF) 100 MCG/2ML SOLUTION: Performed by: INTERNAL MEDICINE

## 2021-03-05 PROCEDURE — C1894 INTRO/SHEATH, NON-LASER: HCPCS | Performed by: INTERNAL MEDICINE

## 2021-03-05 PROCEDURE — 93005 ELECTROCARDIOGRAM TRACING: CPT | Performed by: INTERNAL MEDICINE

## 2021-03-05 PROCEDURE — 93458 L HRT ARTERY/VENTRICLE ANGIO: CPT | Performed by: INTERNAL MEDICINE

## 2021-03-05 PROCEDURE — G0378 HOSPITAL OBSERVATION PER HR: HCPCS

## 2021-03-05 PROCEDURE — 99217 PR OBSERVATION CARE DISCHARGE MANAGEMENT: CPT | Performed by: FAMILY MEDICINE

## 2021-03-05 PROCEDURE — 99152 MOD SED SAME PHYS/QHP 5/>YRS: CPT | Performed by: INTERNAL MEDICINE

## 2021-03-05 PROCEDURE — 0 IOPAMIDOL PER 1 ML: Performed by: INTERNAL MEDICINE

## 2021-03-05 PROCEDURE — 25010000002 MIDAZOLAM PER 1 MG: Performed by: INTERNAL MEDICINE

## 2021-03-05 PROCEDURE — 80048 BASIC METABOLIC PNL TOTAL CA: CPT | Performed by: INTERNAL MEDICINE

## 2021-03-05 PROCEDURE — 85730 THROMBOPLASTIN TIME PARTIAL: CPT | Performed by: INTERNAL MEDICINE

## 2021-03-05 PROCEDURE — 85025 COMPLETE CBC W/AUTO DIFF WBC: CPT | Performed by: INTERNAL MEDICINE

## 2021-03-05 PROCEDURE — 85610 PROTHROMBIN TIME: CPT | Performed by: INTERNAL MEDICINE

## 2021-03-05 PROCEDURE — 96366 THER/PROPH/DIAG IV INF ADDON: CPT

## 2021-03-05 PROCEDURE — 96365 THER/PROPH/DIAG IV INF INIT: CPT

## 2021-03-05 PROCEDURE — C1760 CLOSURE DEV, VASC: HCPCS | Performed by: INTERNAL MEDICINE

## 2021-03-05 RX ORDER — ALUMINA, MAGNESIA, AND SIMETHICONE 2400; 2400; 240 MG/30ML; MG/30ML; MG/30ML
15 SUSPENSION ORAL EVERY 6 HOURS PRN
Status: DISCONTINUED | OUTPATIENT
Start: 2021-03-05 | End: 2021-03-05 | Stop reason: HOSPADM

## 2021-03-05 RX ORDER — ASPIRIN 81 MG/1
81 TABLET ORAL DAILY
Status: DISCONTINUED | OUTPATIENT
Start: 2021-03-05 | End: 2021-03-05 | Stop reason: HOSPADM

## 2021-03-05 RX ORDER — MIDAZOLAM HYDROCHLORIDE 1 MG/ML
INJECTION INTRAMUSCULAR; INTRAVENOUS AS NEEDED
Status: DISCONTINUED | OUTPATIENT
Start: 2021-03-05 | End: 2021-03-05 | Stop reason: HOSPADM

## 2021-03-05 RX ORDER — SODIUM CHLORIDE 9 MG/ML
30 INJECTION, SOLUTION INTRAVENOUS CONTINUOUS
Status: DISCONTINUED | OUTPATIENT
Start: 2021-03-05 | End: 2021-03-05 | Stop reason: HOSPADM

## 2021-03-05 RX ORDER — ACETAMINOPHEN 325 MG/1
650 TABLET ORAL EVERY 4 HOURS PRN
Status: DISCONTINUED | OUTPATIENT
Start: 2021-03-05 | End: 2021-03-05 | Stop reason: HOSPADM

## 2021-03-05 RX ORDER — ATORVASTATIN CALCIUM 40 MG/1
40 TABLET, FILM COATED ORAL DAILY
Qty: 30 TABLET | Refills: 0 | Status: SHIPPED | OUTPATIENT
Start: 2021-03-05 | End: 2021-04-05 | Stop reason: SDUPTHER

## 2021-03-05 RX ORDER — DIPHENHYDRAMINE HCL 25 MG
25 CAPSULE ORAL EVERY 6 HOURS PRN
Status: DISCONTINUED | OUTPATIENT
Start: 2021-03-05 | End: 2021-03-05 | Stop reason: HOSPADM

## 2021-03-05 RX ORDER — LIDOCAINE HYDROCHLORIDE 20 MG/ML
INJECTION, SOLUTION INFILTRATION; PERINEURAL AS NEEDED
Status: DISCONTINUED | OUTPATIENT
Start: 2021-03-05 | End: 2021-03-05 | Stop reason: HOSPADM

## 2021-03-05 RX ORDER — SODIUM CHLORIDE 9 MG/ML
INJECTION, SOLUTION INTRAVENOUS CONTINUOUS PRN
Status: COMPLETED | OUTPATIENT
Start: 2021-03-05 | End: 2021-03-05

## 2021-03-05 RX ORDER — ONDANSETRON 2 MG/ML
4 INJECTION INTRAMUSCULAR; INTRAVENOUS EVERY 6 HOURS PRN
Status: DISCONTINUED | OUTPATIENT
Start: 2021-03-05 | End: 2021-03-05 | Stop reason: HOSPADM

## 2021-03-05 RX ORDER — ONDANSETRON 4 MG/1
4 TABLET, FILM COATED ORAL EVERY 6 HOURS PRN
Status: DISCONTINUED | OUTPATIENT
Start: 2021-03-05 | End: 2021-03-05 | Stop reason: HOSPADM

## 2021-03-05 RX ORDER — ASPIRIN 81 MG/1
81 TABLET ORAL DAILY
Qty: 30 TABLET | Refills: 0 | Status: SHIPPED | OUTPATIENT
Start: 2021-03-06 | End: 2021-04-05

## 2021-03-05 RX ORDER — FENTANYL CITRATE 50 UG/ML
INJECTION, SOLUTION INTRAMUSCULAR; INTRAVENOUS AS NEEDED
Status: DISCONTINUED | OUTPATIENT
Start: 2021-03-05 | End: 2021-03-05 | Stop reason: HOSPADM

## 2021-03-05 RX ADMIN — Medication 1000 UNITS: at 12:59

## 2021-03-05 RX ADMIN — ASPIRIN 81 MG: 81 TABLET, COATED ORAL at 12:59

## 2021-03-05 RX ADMIN — SERTRALINE HYDROCHLORIDE 100 MG: 100 TABLET ORAL at 12:59

## 2021-03-05 RX ADMIN — SODIUM CHLORIDE 30 ML/HR: 0.9 INJECTION, SOLUTION INTRAVENOUS at 13:03

## 2021-03-05 NOTE — OUTREACH NOTE
Prep Survey      Responses   [facility] facility patient discharged from?  [facility]   Is LACE score < 7 ?  Yes   Emergency Room discharge w/ pulse ox?  No   Eligibility  [facility]   Date of Admission  03/03/21   Date of Discharge  03/05/21   Discharge Disposition  Home or Self Care   Discharge diagnosis  New onset atrial fibrillation LEFT HEART CATH    Does the patient have one of the following disease processes/diagnoses(primary or secondary)?  Other   Does the patient have Home health ordered?  No   Is there a DME ordered?  No   Prep survey completed?  Yes          Kisha Bee RN

## 2021-03-05 NOTE — PLAN OF CARE
Goal Outcome Evaluation:  Plan of Care Reviewed With: patient  Progress: improving  Outcome Summary: Patient converted to NSR last night. Patient remains in NSR. Cardiac cath done today and was clean. Patient possibly will see electrophysiology. Sruthinet has 4 hours bed rest. Will continue to monitor.

## 2021-03-05 NOTE — DISCHARGE SUMMARY
Date of Admission: 3/3/2021    Date of Discharge:  3/5/2021    Length of stay:  LOS: 0 days     Discharge Diagnosis:     Atrial fibrillation with RVR    Presenting Problem List:    Palpitations-maximum heart rate in the 130s on admission appeared to be atrial fibrillation initially on calcium channel blockers and then on amiodarone before converting  -Stress test abnormal, cardiology took patient to cardiac cath no significant vessel disease requiring stenting recommending medical management  -Cardiology consulted, after ischemic source ruled out, electrophysiology consulted and patient will be seen in office  -Starting atorvastatin  -Normal D-dimer  -EKG rate controlled atrial fibrillation  -Echo showing normal ejection fraction no signs of diastolic failure with normal valves  -Patient converted to sinus rhythm before discharge able to follow-up with electrophysiology outpatient no anticoagulation rate control medication  -Starting 81 mg aspirin     Hyperlipidemia-patient with   -Starting atorvastatin     Elevated TSH-mild, may be stress response versus true hypothyroidism  -Free T4 appropriate     Depression-patient on chronic medication  -Continue     Obesity-BMI of 30  -Lifestyle modifications    HPI/Hospital Course:     Patient is a 45-year-old male that reports to [facility] ED with acute onset of severe palpitations within the last hour.  Patient reports feeling diaphoretic but denies chest pain.  When patient checked  pulse it was greater than 150.  Patient denies any recent fever, chills, or trauma.  No aggravating or alleviating factors were noted.  Patient reports his last visit to his PCP was greater than 2 years prior.  Patient reports no home medications with the exception of sertraline for depression.  Patient reports his father has atrial fibrillation.  Patient denies any other significant medical history, but does feel like he may have had high blood pressure and high cholesterol  in the past.  Upon arrival to the ED he was in atrial fibrillation with PVCs and borderline ST depression, 20 mg push was given and IV drip was started.  Patient denies use of energy drinks, increased caffeine intake or illegal drug use.    On hospital floor patient hemodynamically stable and heart rate controlled on Cardizem.  Patient had stress test that was abnormal and cardiology was consulted did an initial ischemic work-up including cardiac cath which showed no significant vessel disease.  Electrophysiology consulted and recommended outpatient follow-up to discuss possible ablation.  Patient started on amiodarone night prior to discharge and converted to sinus rhythm.  At this time cardiology inpatient safe for discharge not requiring anticoagulation or rate control with follow-up organized with primary care and electrophysiology.  Patient to be discharged home in good condition with strict return precautions given.    Procedures Performed    Procedure(s):  LEFT HEART CATH with possible PCI  Coronary angiography  Left ventriculography  -------------------       Consults:   Consults     Date and Time Order Name Status Description    3/4/2021 0718 Inpatient Cardiology Consult Completed           Pertinent Test Results:     Lab Results (last 24 hours)     Procedure Component Value Units Date/Time    Protime-INR [[id number]]  (Normal) Collected: 03/05/21 0648    Specimen: Blood Updated: 03/05/21 0755     Protime 11.0 Seconds      INR 1.00    aPTT [[id number]]  (Normal) Collected: 03/05/21 0648    Specimen: Blood Updated: 03/05/21 0755     PTT 24.9 seconds     CBC & Differential [[id number]]  (Normal) Collected: 03/05/21 0648    Specimen: Blood Updated: 03/05/21 0743    Narrative:      The following orders were created for panel order CBC & Differential.  Procedure                               Abnormality         Status                     ---------                               -----------         ------                      CBC Auto Differential[[id number]]        Normal              Final result                 Please view results for these tests on the individual orders.    CBC Auto Differential [[id number]]  (Normal) Collected: 03/05/21 0648    Specimen: Blood Updated: 03/05/21 0743     WBC 4.30 10*3/mm3      RBC 4.65 10*6/mm3      Hemoglobin 14.3 g/dL      Hematocrit 42.3 %      MCV 90.9 fL      MCH 30.8 pg      MCHC 33.9 g/dL      RDW 13.4 %      RDW-SD 42.0 fl      MPV 8.1 fL      Platelets 209 10*3/mm3      Neutrophil % 66.9 %      Lymphocyte % 24.0 %      Monocyte % 6.9 %      Eosinophil % 1.7 %      Basophil % 0.5 %      Neutrophils, Absolute 2.90 10*3/mm3      Lymphocytes, Absolute 1.00 10*3/mm3      Monocytes, Absolute 0.30 10*3/mm3      Eosinophils, Absolute 0.10 10*3/mm3      Basophils, Absolute 0.00 10*3/mm3      nRBC 0.1 /100 WBC     Basic Metabolic Panel [[id number]]  (Abnormal) Collected: 03/05/21 0259    Specimen: Blood Updated: 03/05/21 0704     Glucose 82 mg/dL      BUN 23 mg/dL      Creatinine 1.11 mg/dL      Sodium 137 mmol/L      Potassium 4.6 mmol/L      Comment: Slight hemolysis detected by analyzer. Results may be affected.        Chloride 101 mmol/L      CO2 20.0 mmol/L      Calcium 9.7 mg/dL      eGFR Non African Amer 72 mL/min/1.73      BUN/Creatinine Ratio 20.7     Anion Gap 16.0 mmol/L     Narrative:      GFR Normal >60  Chronic Kidney Disease <60  Kidney Failure <15      Magnesium [[id number]]  (Normal) Collected: 03/05/21 0259    Specimen: Blood Updated: 03/05/21 0433     Magnesium 2.0 mg/dL     Basic Metabolic Panel [[id number]]  (Normal) Collected: 03/04/21 1355    Specimen: Blood Updated: 03/04/21 1611     Glucose 73 mg/dL      BUN 17 mg/dL      Creatinine 1.02 mg/dL      Sodium 139 mmol/L      Potassium 4.3 mmol/L      Comment: Slight hemolysis detected by analyzer. Results may be affected.        Chloride 103 mmol/L      CO2 23.0 mmol/L      Calcium 9.5 mg/dL      eGFR Non African Amer 80  mL/min/1.73      BUN/Creatinine Ratio 16.7     Anion Gap 13.0 mmol/L     Narrative:      GFR Normal >60  Chronic Kidney Disease <60  Kidney Failure <15      T4, Free [[id number]]  (Normal) Collected: 03/04/21 0556    Specimen: Blood Updated: 03/04/21 1455     Free T4 1.11 ng/dL     Narrative:      Results may be falsely increased if patient taking Biotin.      COVID PRE-OP / PRE-PROCEDURE SCREENING ORDER (NO ISOLATION) - Swab, Nasopharynx [[id number]]  (Normal) Collected: 03/04/21 0407    Specimen: Swab from Nasopharynx Updated: 03/04/21 1446    Narrative:      The following orders were created for panel order COVID PRE-OP / PRE-PROCEDURE SCREENING ORDER (NO ISOLATION) - Swab, Nasopharynx.  Procedure                               Abnormality         Status                     ---------                               -----------         ------                     COVID-19,APTIMA PANTHER,...[[id number]]  Normal              Final result                 Please view results for these tests on the individual orders.    COVID-19,APTIMA PANTHER,JESSICA IN-HOUSE, NP/OP SWAB IN UTM/VTM/SALINE TRANSPORT MEDIA,24 HR TAT - Swab, Nasopharynx [[id number]]  (Normal) Collected: 03/04/21 0407    Specimen: Swab from Nasopharynx Updated: 03/04/21 1446     COVID19 Not Detected    Narrative:      Fact sheet for providers: https://www.fda.gov/media/894646/download     Fact sheet for patients: https://www.fda.gov/media/578547/download    Test performed by RT PCR.    Troponin [[id number]]  (Normal) Collected: 03/04/21 1355    Specimen: Blood Updated: 03/04/21 1438     Troponin T <0.010 ng/mL     Narrative:      Troponin T Reference Range:  <= 0.03 ng/mL-   Negative for AMI  >0.03 ng/mL-     Abnormal for myocardial necrosis.  Clinicians would have to utilize clinical acumen, EKG, Troponin and serial changes to determine if it is an Acute Myocardial Infarction or myocardial injury due to an underlying chronic condition.       Results may be falsely  decreased if patient taking Biotin.            Microbiology Results Abnormal     Procedure Component Value - Date/Time    COVID PRE-OP / PRE-PROCEDURE SCREENING ORDER (NO ISOLATION) - Swab, Nasopharynx [[id number]]  (Normal) Collected: 03/04/21 0407    Lab Status: Final result Specimen: Swab from Nasopharynx Updated: 03/04/21 1446    Narrative:      The following orders were created for panel order COVID PRE-OP / PRE-PROCEDURE SCREENING ORDER (NO ISOLATION) - Swab, Nasopharynx.  Procedure                               Abnormality         Status                     ---------                               -----------         ------                     COVID-19,APTIMA PANTHER,...[[id number]]  Normal              Final result                 Please view results for these tests on the individual orders.    COVID-19,APTIMA PANTHER,JESSICA IN-HOUSE, NP/OP SWAB IN UTM/VTM/SALINE TRANSPORT MEDIA,24 HR TAT - Swab, Nasopharynx [[id number]]  (Normal) Collected: 03/04/21 0407    Lab Status: Final result Specimen: Swab from Nasopharynx Updated: 03/04/21 1446     COVID19 Not Detected    Narrative:      Fact sheet for providers: https://www.fda.gov/media/832682/download     Fact sheet for patients: https://www.fda.gov/media/535589/download    Test performed by RT PCR.        Xr Chest 1 View    Result Date: 3/4/2021  No acute cardiopulmonary abnormality.  Electronically Signed By-Sherri Hurst MD On:3/4/2021 7:39 AM This report was finalized on [id number] by  Sherri Hurst MD.      Results for orders placed during the hospital encounter of 03/03/21   Adult Transthoracic Echo Complete With Contrast if Necessary Per Protocol    Narrative · Estimated left ventricular EF was in agreement with the calculated left   ventricular EF. Left ventricular ejection fraction appears to be 61 - 65%.   Left ventricular systolic function is normal.  · Estimated right ventricular systolic pressure from tricuspid   regurgitation is normal (<35 mmHg).                   Condition on Discharge:  Good    Vital Signs  Temp:  [97.4 °F (36.3 °C)-98.2 °F (36.8 °C)] 98.2 °F (36.8 °C)  Heart Rate:  [57-98] 63  Resp:  [14-16] 14  BP: ()/(54-81) 110/68    Physical Exam:    General: Obese middle-age male lying in bed breathing comfortably on room air, no acute distress  HEENT: NC/AT, EOMI, mucosa moist  Heart: Irregular, rate controlled  Chest: Normal work of breathing, moving air well no wheezing or crackles  Abdominal: Soft. NT/ND.   Musculoskeletal: Normal ROM.  No edema. No calf tenderness.  Neurological: AAOx3, no focal deficits  Skin: Skin is warm and dry. No rash  Psychiatric: Normal mood and affect.    Discharge Disposition  Home or Self Care [1]    Discharge Medications     Discharge Medications      New Medications      Instructions Start Date   aspirin 81 MG EC tablet   81 mg, Oral, Daily   Start Date: March 6, 2021     atorvastatin 40 MG tablet  Commonly known as: LIPITOR   40 mg, Oral, Daily         Continue These Medications      Instructions Start Date   cholecalciferol 25 MCG (1000 UT) tablet  Commonly known as: VITAMIN D3   1,000 Units, Oral, Daily      sertraline 100 MG tablet  Commonly known as: ZOLOFT   100 mg, Oral, Daily             Discharge Diet:   Diet Instructions     Diet: Regular      Discharge Diet: Regular          Activity at Discharge:   Activity Instructions     Activity as Tolerated            Follow-up Appointments  No future appointments.  Additional Instructions for the Follow-ups that You Need to Schedule     Discharge Follow-up with PCP   As directed       Currently Documented PCP:    Brando Piper MD    PCP Phone Number:    [phone]     Follow Up Details: Please follow-up with your primary care doctor within a week to recheck your heart rate         Discharge Follow-up with Specified Provider: Please follow-up with electrophysiology/cardiology as indicated   As directed      To: Please follow-up with electrophysiology/cardiology  as indicated               Test Results Pending at Discharge       Risk for Readmission (LACE) Score: 3 (3/5/2021  6:00 AM)        Morris Jefferson MD  03/05/21  13:39 EST      Time: Discharge 25 min total time spent with face-to-face history/exam, writing all prescriptions, preparing medication reconciliation, and documenting discharge data including chart review of the full admission, care co-ordination with patient, family, , and , etc., and follow-up appointments with PCP and specialists as recommended.

## 2021-03-05 NOTE — CONSULTS
Pt removed from Cardiac Rehab list due to know qualifying diagnosis such as recent MI, coronary intervention, OHS, or EF less than 35%.   Detailed message left on patient's VM with Dr. Nj's note as written below.  Requested that he call back with updates.    Is he ok with clinic removing his amoxicillin/PCN allergy from epic?    Candy De Leon RN

## 2021-03-05 NOTE — PLAN OF CARE
Problem: Adult Inpatient Plan of Care  Goal: Plan of Care Review  Outcome: Ongoing, Progressing  Flowsheets  Taken 3/5/2021 0235 by Shaq Valerio, RN  Progress: no change  Plan of Care Reviewed With: patient  Taken 3/4/2021 1308 by Sherri Roman, RN  Outcome Summary: Patient converted to normal sinus with PVCs. Cardizem gtt and amiodarone kept on per cardiology. Heart Catheterization today. Patient stable, will continue to monitor while awaiting results.   Goal Outcome Evaluation:  Plan of Care Reviewed With: patient  Progress: no change

## 2021-03-07 LAB — QT INTERVAL: 310 MS

## 2021-03-08 ENCOUNTER — TELEPHONE (OUTPATIENT)
Dept: FAMILY MEDICINE CLINIC | Facility: CLINIC | Age: 46
End: 2021-03-08

## 2021-03-08 NOTE — TELEPHONE ENCOUNTER
Caller: Lynda Zimmerman    Relationship to patient: Emergency Contact    Best call back number: [phone]     Chief complaint: AFIB    Type of visit: HOSPITAL FOLLOW UP    Requested date: WEEK OF 3/8/2021      Additional notes: LYNDA CALLED TO SCHEDULE A HOSPITAL FOLLOW UP WITH DR. MANRIQUE, NONE AVAILABLE , OFFERED ANOTHER PROVIDER. SHE WOULD RATHER MR. ZIMMERMAN SEE DR. MANRIQUE       PLEASE ADVISE   DAMIÁN WILLIAMSON  ADMIT 3/3/2021  DISCHARGE 3/5/2021  DIAGNOSIS : AFIB

## 2021-03-08 NOTE — PROGRESS NOTES
Case Management Discharge Note             Selected Continued Care - Discharged on 3/5/2021 Admission date: 3/3/2021 - Discharge disposition: Home or Self Care     Final Discharge Disposition Code: 01 - home or self-care

## 2021-03-11 ENCOUNTER — OFFICE VISIT (OUTPATIENT)
Dept: FAMILY MEDICINE CLINIC | Facility: CLINIC | Age: 46
End: 2021-03-11

## 2021-03-11 VITALS
HEIGHT: 73 IN | OXYGEN SATURATION: 98 % | TEMPERATURE: 96.9 F | RESPIRATION RATE: 18 BRPM | DIASTOLIC BLOOD PRESSURE: 78 MMHG | WEIGHT: 237 LBS | BODY MASS INDEX: 31.41 KG/M2 | HEART RATE: 95 BPM | SYSTOLIC BLOOD PRESSURE: 120 MMHG

## 2021-03-11 DIAGNOSIS — E78.5 HYPERLIPIDEMIA LDL GOAL <70: ICD-10-CM

## 2021-03-11 DIAGNOSIS — R94.39 ABNORMAL NUCLEAR STRESS TEST: ICD-10-CM

## 2021-03-11 DIAGNOSIS — F34.1 DYSTHYMIA: Chronic | ICD-10-CM

## 2021-03-11 DIAGNOSIS — I48.91 NEW ONSET ATRIAL FIBRILLATION (HCC): Primary | ICD-10-CM

## 2021-03-11 PROCEDURE — 99496 TRANSJ CARE MGMT HIGH F2F 7D: CPT | Performed by: FAMILY MEDICINE

## 2021-03-11 NOTE — PROGRESS NOTES
Transitional Care Follow Up Visit  Subjective     Ashkan Farley is a 45 y.o. male who presents for a transitional care management visit.    Within 48 business hours after discharge our office contacted him via telephone to coordinate his care and needs.      I reviewed and discussed the details of that call along with the discharge summary, hospital problems, inpatient lab results, inpatient diagnostic studies, and consultation reports with Ashkan.     Current outpatient and discharge medications have been reconciled for the patient.  Reviewed by: Brando Piper MD      Date of TCM Phone Call 3/5/2021   [facility]   Date of Admission 3/3/2021   Date of Discharge 3/5/2021   Discharge Disposition Home or Self Care     Risk for Readmission (LACE) No data recorded      Ashkan is a 45-year-old white male who is followed in our office for his general medical care.  On the day of his hospital admission Ashkan had the onset of rapid heartbeat and palpitations in his chest.  Although he had felt palpitations in the past they never persisted for more than a few seconds.  This time he was feeling some chest pressure and dyspnea and became frightened by the persistence of his symptoms.  He finally decided to go on to the emergency room because of the discomfort and he was found to have a rapid heart rate of about 130 and his underlying rhythm appeared to be atrial fibrillation.  He was initially given calcium channel blockers which slowed his rate down but did not convert him.  Amiodarone was added in and he converted to normal sinus rhythm within a short period of time.  His hospital course then consisted of a cardiac work-up including stress test that turned out to be abnormal but did lead to a cardiac cath that showed no significant vessel disease and medical management was suggested.  Electrophysiology was consulted but this consultation will be done on an outpatient basis.  His echocardiogram showed normal valves  and normal ejection fraction.  Because of an elevated cholesterol he was started on atorvastatin.  Low-carb diet was suggested.  His chads score was low so no anticoagulant was felt to be needed other than a baby aspirin.  His TSH was slightly elevated and will be followed up on.  His BMI was 30 so a low-carb diet was recommended along with graduated exercise to lose weight.   His past medical history was significant for some depression for which she is under treatment.  His family history was significant for father who also had atrial fib.  Patient has no illegal drug use and does not drink alcohol heavily.  He does not smoke.  He denied any heavy energy drink use.  The patient was felt safe for discharge on both Cardizem and amiodarone.  He was to follow-up with me in the office today and he is scheduled to see the electrophysiology team in the near future.                      Course During Hospital Stay:  2 days     The following portions of the patient's history were reviewed and updated as appropriate: allergies, current medications, past family history, past medical history, past social history, past surgical history and problem list.    Review of Systems   Constitutional: Negative.  Negative for diaphoresis, fatigue and fever.   HENT: Negative.  Negative for congestion, ear pain, hearing loss, postnasal drip, sinus pressure, sore throat, trouble swallowing and voice change.    Eyes: Negative.  Negative for pain, redness and visual disturbance.   Respiratory: Negative.  Negative for cough, chest tightness and shortness of breath.    Cardiovascular: Negative.  Negative for chest pain, palpitations and leg swelling.   Gastrointestinal: Negative.  Negative for abdominal pain, blood in stool, constipation and diarrhea.   Endocrine: Negative.  Negative for cold intolerance and heat intolerance.   Genitourinary: Negative.  Negative for difficulty urinating, enuresis, flank pain, frequency and urgency.    Musculoskeletal: Negative.  Negative for arthralgias, back pain, myalgias, neck pain and neck stiffness.   Skin: Negative.  Negative for color change and rash.   Allergic/Immunologic: Negative.  Negative for environmental allergies.   Neurological: Negative.  Negative for syncope, weakness and headaches.   Hematological: Negative.  Does not bruise/bleed easily.   Psychiatric/Behavioral: Negative.  Negative for behavioral problems, decreased concentration, dysphoric mood and suicidal ideas. The patient is not nervous/anxious.        Objective   Physical Exam  Constitutional:       Appearance: Normal appearance. He is well-developed and normal weight.   HENT:      Head: Normocephalic and atraumatic.      Right Ear: Tympanic membrane, ear canal and external ear normal.      Left Ear: Tympanic membrane, ear canal and external ear normal.      Nose: Nose normal.      Mouth/Throat:      Mouth: Mucous membranes are moist.      Pharynx: Oropharynx is clear. No oropharyngeal exudate.   Eyes:      Extraocular Movements: Extraocular movements intact.      Conjunctiva/sclera: Conjunctivae normal.      Pupils: Pupils are equal, round, and reactive to light.   Cardiovascular:      Rate and Rhythm: Normal rate and regular rhythm.      Pulses: Normal pulses.      Heart sounds: Normal heart sounds.   Pulmonary:      Effort: Pulmonary effort is normal.      Breath sounds: Normal breath sounds.   Abdominal:      General: Bowel sounds are normal.      Palpations: Abdomen is soft.   Musculoskeletal:         General: Normal range of motion.      Cervical back: Normal range of motion and neck supple.   Skin:     General: Skin is warm and dry.   Neurological:      General: No focal deficit present.      Mental Status: He is alert and oriented to person, place, and time. Mental status is at baseline.   Psychiatric:         Mood and Affect: Mood normal.         Behavior: Behavior normal.         Thought Content: Thought content normal.          Judgment: Judgment normal.         Assessment/Plan   Problems Addressed this Visit        Cardiac and Vasculature    New onset atrial fibrillation (CMS/HCC) - Primary     Patient is currently in sinus rhythm.   In the hospital he was on both Cardizem CD and amiodarone.  He converted to normal sinus rhythm prior to discharge so no rate control medication was sent home with him.  He will be seeing electrophysiology in a few weeks and a decision about ablation will be made.  In the meantime he has unrestricted activities.         Abnormal nuclear stress test     Patient had an abnormal stress test in the hospital which Adeline led to his cardiac catheterization.  Fortunately he had normal cardiac cath with no significant coronary artery disease         Hyperlipidemia LDL goal <70     Lipid abnormalities are newly identified.  Nutritional counseling was provided. and Pharmacotherapy as ordered.  Lipids will be reassessed in 6 months.    Patient has not been in the CS for several years.  In the hospital his LDL was elevated almost to 170.  Atorvastatin was started and he will be rechecked again in about 3 months            Mental Health    Depression (Chronic)     Patient has been on Zoloft for over a year and it has helped his mood and anxiety tremendously.  This will be continued           Diagnoses       Codes Comments    New onset atrial fibrillation (CMS/HCC)    -  Primary ICD-10-CM: I48.91  ICD-9-CM: 427.31     Hyperlipidemia LDL goal <70     ICD-10-CM: E78.5  ICD-9-CM: 272.4     Abnormal nuclear stress test     ICD-10-CM: R94.39  ICD-9-CM: 794.39     Dysthymia     ICD-10-CM: F34.1  ICD-9-CM: 300.4                    Answers for HPI/ROS submitted by the patient on 3/9/2021  What is the primary reason for your visit?: Other  Please describe your symptoms.: Follow up after being released from hospital with afib.  Discuss testing and results that was completed and some abnormal/high results.  One being cholesterol  and other being elevated TSH level.  Have you had these symptoms before?: No  How long have you been having these symptoms?: 1-2 weeks  Please list any medications you are currently taking for this condition.: Atorvastatin 40mg / Aspirin 81mg  Please describe any probable cause for these symptoms. : Not sure.  My dad's family has history of heart issues and my mother has had thyroid issues.

## 2021-03-12 LAB — QT INTERVAL: 383 MS

## 2021-03-17 ENCOUNTER — TELEPHONE (OUTPATIENT)
Dept: FAMILY MEDICINE CLINIC | Facility: CLINIC | Age: 46
End: 2021-03-17

## 2021-03-21 RX ORDER — AMIODARONE HYDROCHLORIDE 200 MG/1
200 TABLET ORAL DAILY
COMMUNITY
End: 2021-03-21

## 2021-03-21 NOTE — ASSESSMENT & PLAN NOTE
Lipid abnormalities are newly identified.  Nutritional counseling was provided. and Pharmacotherapy as ordered.  Lipids will be reassessed in 6 months.    Patient has not been in the  for several years.  In the hospital his LDL was elevated almost to 170.  Atorvastatin was started and he will be rechecked again in about 3 months

## 2021-03-21 NOTE — PATIENT INSTRUCTIONS
Atrial Fibrillation    Atrial fibrillation is a type of irregular or rapid heartbeat (arrhythmia). In atrial fibrillation, the top part of the heart (atria) beats in an irregular pattern. This makes the heart unable to pump blood normally and effectively.  The goal of treatment is to prevent blood clots from forming, control your heart rate, or restore your heartbeat to a normal rhythm. If this condition is not treated, it can cause serious problems, such as a weakened heart muscle (cardiomyopathy) or a stroke.  What are the causes?  This condition is often caused by medical conditions that damage the heart's electrical system. These include:  · High blood pressure (hypertension). This is the most common cause.  · Certain heart problems or conditions, such as heart failure, coronary artery disease, heart valve problems, or heart surgery.  · Diabetes.  · Overactive thyroid (hyperthyroidism).  · Obesity.  · Chronic kidney disease.  In some cases, the cause of this condition is not known.  What increases the risk?  This condition is more likely to develop in:  · Older people.  · People who smoke.  · Athletes who do endurance exercise.  · People who have a family history of atrial fibrillation.  · Men.  · People who use drugs.  · People who drink a lot of alcohol.  · People who have lung conditions, such as emphysema, pneumonia, or COPD.  · People who have obstructive sleep apnea.  What are the signs or symptoms?  Symptoms of this condition include:  · A feeling that your heart is racing or beating irregularly.  · Discomfort or pain in your chest.  · Shortness of breath.  · Sudden light-headedness or weakness.  · Tiring easily during exercise or activity.  · Fatigue.  · Syncope (fainting).  · Sweating.  In some cases, there are no symptoms.  How is this diagnosed?  Your health care provider may detect atrial fibrillation when taking your pulse. If detected, this condition may be diagnosed with:  · An electrocardiogram  (ECG) to check electrical signals of the heart.  · An ambulatory cardiac monitor to record your heart's activity for a few days.  · A transthoracic echocardiogram (TTE) to create pictures of your heart.  · A transesophageal echocardiogram (LUISA) to create even closer pictures of your heart.  · A stress test to check your blood supply while you exercise.  · Imaging tests, such as a CT scan or chest X-ray.  · Blood tests.  How is this treated?  Treatment depends on underlying conditions and how you feel when you experience atrial fibrillation. This condition may be treated with:  · Medicines to prevent blood clots or to treat heart rate or heart rhythm problems.  · Electrical cardioversion to reset the heart's rhythm.  · A pacemaker to correct abnormal heart rhythm.  · Ablation to remove the heart tissue that sends abnormal signals.  · Left atrial appendage closure to seal the area where blood clots can form.  In some cases, underlying conditions will be treated.  Follow these instructions at home:  Medicines  · Take over-the counter and prescription medicines only as told by your health care provider.  · Do not take any new medicines without talking to your health care provider.  · If you are taking blood thinners:  ? Talk with your health care provider before you take any medicines that contain aspirin or NSAIDs, such as ibuprofen. These medicines increase your risk for dangerous bleeding.  ? Take your medicine exactly as told, at the same time every day.  ? Avoid activities that could cause injury or bruising, and follow instructions about how to prevent falls.  ? Wear a medical alert bracelet or carry a card that lists what medicines you take.  Lifestyle         · Do not use any products that contain nicotine or tobacco, such as cigarettes, e-cigarettes, and chewing tobacco. If you need help quitting, ask your health care provider.  · Eat heart-healthy foods. Talk with a dietitian to make an eating plan that is  "right for you.  · Exercise regularly as told by your health care provider.  · Do not drink alcohol.  · Lose weight if you are overweight.  · Do not use drugs, including cannabis.  General instructions  · If you have obstructive sleep apnea, manage your condition as told by your health care provider.  · Do not use diet pills unless your health care provider approves. Diet pills can make heart problems worse.  · Keep all follow-up visits as told by your health care provider. This is important.  Contact a health care provider if you:  · Notice a change in the rate, rhythm, or strength of your heartbeat.  · Are taking a blood thinner and you notice more bruising.  · Tire more easily when you exercise or do heavy work.  · Have a sudden change in weight.  Get help right away if you have:    · Chest pain, abdominal pain, sweating, or weakness.  · Trouble breathing.  · Side effects of blood thinners, such as blood in your vomit, stool, or urine, or bleeding that cannot stop.  · Any symptoms of a stroke. \"BE FAST\" is an easy way to remember the main warning signs of a stroke:  ? B - Balance. Signs are dizziness, sudden trouble walking, or loss of balance.  ? E - Eyes. Signs are trouble seeing or a sudden change in vision.  ? F - Face. Signs are sudden weakness or numbness of the face, or the face or eyelid drooping on one side.  ? A - Arms. Signs are weakness or numbness in an arm. This happens suddenly and usually on one side of the body.  ? S - Speech. Signs are sudden trouble speaking, slurred speech, or trouble understanding what people say.  ? T - Time. Time to call emergency services. Write down what time symptoms started.  · Other signs of a stroke, such as:  ? A sudden, severe headache with no known cause.  ? Nausea or vomiting.  ? Seizure.  These symptoms may represent a serious problem that is an emergency. Do not wait to see if the symptoms will go away. Get medical help right away. Call your local emergency " services (911 in the [place] Do not drive yourself to the hospital.  Summary  · Atrial fibrillation is a type of irregular or rapid heartbeat (arrhythmia).  · Symptoms include a feeling that your heart is beating fast or irregularly.  · You may be given medicines to prevent blood clots or to treat heart rate or heart rhythm problems.  · Get help right away if you have signs or symptoms of a stroke.  · Get help right away if you cannot catch your breath or have chest pain or pressure.  This information is not intended to replace advice given to you by your health care provider. Make sure you discuss any questions you have with your health care provider.  Document Revised: 06/10/2020 Document Reviewed: 06/10/2020  Elsevier Patient Education © 2021 [employer]

## 2021-03-21 NOTE — ASSESSMENT & PLAN NOTE
Patient has been on Zoloft for over a year and it has helped his mood and anxiety tremendously.  This will be continued

## 2021-03-21 NOTE — ASSESSMENT & PLAN NOTE
Patient is currently in sinus rhythm.   In the hospital he was on both Cardizem CD and amiodarone.  He converted to normal sinus rhythm prior to discharge so no rate control medication was sent home with him.  He will be seeing electrophysiology in a few weeks and a decision about ablation will be made.  In the meantime he has unrestricted activities.

## 2021-03-21 NOTE — ASSESSMENT & PLAN NOTE
Patient had an abnormal stress test in the hospital which Adeline led to his cardiac catheterization.  Fortunately he had normal cardiac cath with no significant coronary artery disease

## 2021-03-23 RX ORDER — SERTRALINE HYDROCHLORIDE 100 MG/1
TABLET, FILM COATED ORAL
Qty: 30 TABLET | Refills: 1 | Status: SHIPPED | OUTPATIENT
Start: 2021-03-23 | End: 2021-06-01

## 2021-03-26 LAB
QT INTERVAL: 345 MS
QT INTERVAL: 370 MS
QT INTERVAL: 415 MS
QT INTERVAL: 430 MS

## 2021-04-05 RX ORDER — ATORVASTATIN CALCIUM 40 MG/1
40 TABLET, FILM COATED ORAL DAILY
Qty: 90 TABLET | Refills: 0 | Status: SHIPPED | OUTPATIENT
Start: 2021-04-05 | End: 2021-05-05

## 2021-04-12 ENCOUNTER — CONSULT (OUTPATIENT)
Dept: CARDIOLOGY | Facility: CLINIC | Age: 46
End: 2021-04-12

## 2021-04-12 VITALS
OXYGEN SATURATION: 100 % | HEART RATE: 54 BPM | WEIGHT: 230 LBS | SYSTOLIC BLOOD PRESSURE: 143 MMHG | DIASTOLIC BLOOD PRESSURE: 85 MMHG | BODY MASS INDEX: 30.34 KG/M2

## 2021-04-12 DIAGNOSIS — E78.1 PURE HYPERTRIGLYCERIDEMIA: ICD-10-CM

## 2021-04-12 DIAGNOSIS — I48.91 NEW ONSET ATRIAL FIBRILLATION (HCC): Primary | ICD-10-CM

## 2021-04-12 DIAGNOSIS — R00.2 PALPITATIONS: ICD-10-CM

## 2021-04-12 DIAGNOSIS — I48.0 PAROXYSMAL ATRIAL FIBRILLATION (HCC): ICD-10-CM

## 2021-04-12 PROCEDURE — 93000 ELECTROCARDIOGRAM COMPLETE: CPT | Performed by: INTERNAL MEDICINE

## 2021-04-12 PROCEDURE — 99204 OFFICE O/P NEW MOD 45 MIN: CPT | Performed by: INTERNAL MEDICINE

## 2021-04-12 RX ORDER — ASPIRIN 81 MG/1
81 TABLET ORAL DAILY
COMMUNITY
End: 2021-04-12

## 2021-04-12 NOTE — PROGRESS NOTES
Answers for HPI/ROS submitted by the patient on 4/11/2021  What is the primary reason for your visit?: Other  Please describe your symptoms.: follow up for recent afib episode where I was in hospital.  Have you had these symptoms before?: No  How long have you been having these symptoms?: Greater than 2 weeks  Please list any medications you are currently taking for this condition.: atorvastatin and aspirin  Please describe any probable cause for these symptoms. : ?    Sub--Patient presented to [facility] 3/3/2021 with report of palpitations that began approximately 1 hour prior.  He reports associated diaphoresis.  He denies any actual chest pain, pressure, tightness.  He denies any shortness of breath, lower extremity edema, dizziness or lightheadedness.  Patient did state he has had palpitations in the past, thought to be due to anxiety.  Hence he was started on sertraline. Patient was given 20 mg bolus of IV Cardizem and started on Cardizem drip.  He is on treatment dose Lovenox.  He was also started on full dose aspirin.  TSH 5.22, troponin negative x2, D-dimer negative.  2D echocardiogram and stress testing were ordered.  In nuclear stress lab, patient's heart rate 111-115.  After Lexiscan administered, heart rate elevated to 181.  He was relatively asymptomatic.  Patient was given 2.5 mg IV Lopressor with improvement in heart rate.  He remained in A. fib.  Patient reports father has history of A. fib as well. Nuclear stress test performed and demonstrated abnormal perfusion with stress involving the inferior wall suggestive of ischemia. Cath without any sig disease.No recurrent AF since admission  No sleep apnea sx  Had AF 4 years ago.          Past Medical History:   Diagnosis Date   • Atrial fibrillation (CMS/HCC)    • Depression      Past Surgical History:   Procedure Laterality Date   • CARDIAC CATHETERIZATION N/A 3/5/2021    Procedure: LEFT HEART CATH with possible PCI;  Surgeon: Maycol  Rick Parekh DO;  Location: [facility] CATH INVASIVE LOCATION;  Service: Cardiology;  Laterality: N/A;  Local and IV sedation   • CARDIAC CATHETERIZATION N/A 3/5/2021    Procedure: Coronary angiography;  Surgeon: Rick Severino DO;  Location: [facility] CATH INVASIVE LOCATION;  Service: Cardiology;  Laterality: N/A;   • CARDIAC CATHETERIZATION N/A 3/5/2021    Procedure: Left ventriculography;  Surgeon: Rick Severino DO;  Location: [facility] CATH INVASIVE LOCATION;  Service: Cardiology;  Laterality: N/A;   • SHOULDER SURGERY       Family History   Problem Relation Age of Onset   • Heart disease Father    • Atrial fibrillation Father      Review of Systems   General:  No for fatigue and tiredness  Eyes: No redness  Cardiovascular: No chest pain, no palpitations        Physical Exam    General:      well developed, well nourished, in no acute distress.    Head:      normocephalic and atraumatic.    Eyes:      PERRL/EOM intact, conjunctiva and sclera clear with out nystagmus.    Neck:      no masses, thyromegaly, trachea central with normal respiratory effort  Lungs:      clear bilaterally to auscultation.    Heart:    regular rate and rhythm, S1, S2 without murmurs, rubs, or gallops      A/P    PAF  Anxiety ?  Hyperlipidemia on statins  Recent labs, echo and cath reports reviewed  Recent consult note reviewed  Smart watch apps   AF RX options educated  Had Peptic ulcer--stop aspirin  Patient educated about dietary Modification for hyperlipidemia and hypercholesterolemia    Follow up in 3 months    meds reviewed.        ECG 12 Lead    Date/Time: 4/12/2021 9:46 AM  Performed by: Naren Darby MD  Authorized by: Naren Darby MD   Comparison: compared with previous ECG   Similar to previous ECG  Rhythm: sinus rhythm  Rate: normal  Conduction: conduction normal  QRS axis: normal            Electronically signed by Naren Darby MD, 04/12/21, 9:46 AM EDT.

## 2021-06-01 RX ORDER — SERTRALINE HYDROCHLORIDE 100 MG/1
TABLET, FILM COATED ORAL
Qty: 30 TABLET | Refills: 0 | Status: SHIPPED | OUTPATIENT
Start: 2021-06-01 | End: 2021-07-05

## 2021-07-05 RX ORDER — SERTRALINE HYDROCHLORIDE 100 MG/1
TABLET, FILM COATED ORAL
Qty: 30 TABLET | Refills: 0 | Status: SHIPPED | OUTPATIENT
Start: 2021-07-05 | End: 2021-08-04

## 2021-07-09 ENCOUNTER — OFFICE VISIT (OUTPATIENT)
Dept: FAMILY MEDICINE CLINIC | Facility: CLINIC | Age: 46
End: 2021-07-09

## 2021-07-09 ENCOUNTER — LAB (OUTPATIENT)
Dept: FAMILY MEDICINE CLINIC | Facility: CLINIC | Age: 46
End: 2021-07-09

## 2021-07-09 VITALS
WEIGHT: 231 LBS | OXYGEN SATURATION: 99 % | RESPIRATION RATE: 20 BRPM | DIASTOLIC BLOOD PRESSURE: 90 MMHG | TEMPERATURE: 99.3 F | BODY MASS INDEX: 30.62 KG/M2 | HEIGHT: 73 IN | HEART RATE: 72 BPM | SYSTOLIC BLOOD PRESSURE: 135 MMHG

## 2021-07-09 DIAGNOSIS — I48.91 NEW ONSET ATRIAL FIBRILLATION (HCC): Primary | ICD-10-CM

## 2021-07-09 DIAGNOSIS — F34.1 DYSTHYMIA: ICD-10-CM

## 2021-07-09 DIAGNOSIS — E78.5 HYPERLIPIDEMIA LDL GOAL <70: ICD-10-CM

## 2021-07-09 LAB
ALBUMIN SERPL-MCNC: 4.6 G/DL (ref 3.5–5.2)
ALBUMIN/GLOB SERPL: 1.8 G/DL
ALP SERPL-CCNC: 61 U/L (ref 39–117)
ALT SERPL W P-5'-P-CCNC: 28 U/L (ref 1–41)
ANION GAP SERPL CALCULATED.3IONS-SCNC: 8.5 MMOL/L (ref 5–15)
AST SERPL-CCNC: 22 U/L (ref 1–40)
BASOPHILS # BLD AUTO: 0.04 10*3/MM3 (ref 0–0.2)
BASOPHILS NFR BLD AUTO: 0.9 % (ref 0–1.5)
BILIRUB SERPL-MCNC: 0.5 MG/DL (ref 0–1.2)
BUN SERPL-MCNC: 19 MG/DL (ref 6–20)
BUN/CREAT SERPL: 16.1 (ref 7–25)
CALCIUM SPEC-SCNC: 9.5 MG/DL (ref 8.6–10.5)
CHLORIDE SERPL-SCNC: 100 MMOL/L (ref 98–107)
CHOLEST SERPL-MCNC: 187 MG/DL (ref 0–200)
CO2 SERPL-SCNC: 27.5 MMOL/L (ref 22–29)
CREAT SERPL-MCNC: 1.18 MG/DL (ref 0.76–1.27)
DEPRECATED RDW RBC AUTO: 42.4 FL (ref 37–54)
EOSINOPHIL # BLD AUTO: 0.09 10*3/MM3 (ref 0–0.4)
EOSINOPHIL NFR BLD AUTO: 2 % (ref 0.3–6.2)
ERYTHROCYTE [DISTWIDTH] IN BLOOD BY AUTOMATED COUNT: 12.7 % (ref 12.3–15.4)
GFR SERPL CREATININE-BSD FRML MDRD: 67 ML/MIN/1.73
GLOBULIN UR ELPH-MCNC: 2.5 GM/DL
GLUCOSE SERPL-MCNC: 95 MG/DL (ref 65–99)
HCT VFR BLD AUTO: 46.8 % (ref 37.5–51)
HDLC SERPL-MCNC: 49 MG/DL (ref 40–60)
HGB BLD-MCNC: 15.8 G/DL (ref 13–17.7)
IMM GRANULOCYTES # BLD AUTO: 0.01 10*3/MM3 (ref 0–0.05)
IMM GRANULOCYTES NFR BLD AUTO: 0.2 % (ref 0–0.5)
LDLC SERPL CALC-MCNC: 118 MG/DL (ref 0–100)
LDLC/HDLC SERPL: 2.36 {RATIO}
LYMPHOCYTES # BLD AUTO: 1.05 10*3/MM3 (ref 0.7–3.1)
LYMPHOCYTES NFR BLD AUTO: 23.6 % (ref 19.6–45.3)
MCH RBC QN AUTO: 30.8 PG (ref 26.6–33)
MCHC RBC AUTO-ENTMCNC: 33.8 G/DL (ref 31.5–35.7)
MCV RBC AUTO: 91.2 FL (ref 79–97)
MONOCYTES # BLD AUTO: 0.46 10*3/MM3 (ref 0.1–0.9)
MONOCYTES NFR BLD AUTO: 10.3 % (ref 5–12)
NEUTROPHILS NFR BLD AUTO: 2.8 10*3/MM3 (ref 1.7–7)
NEUTROPHILS NFR BLD AUTO: 63 % (ref 42.7–76)
NRBC BLD AUTO-RTO: 0 /100 WBC (ref 0–0.2)
PLATELET # BLD AUTO: 215 10*3/MM3 (ref 140–450)
PMV BLD AUTO: 10.1 FL (ref 6–12)
POTASSIUM SERPL-SCNC: 4.7 MMOL/L (ref 3.5–5.2)
PROT SERPL-MCNC: 7.1 G/DL (ref 6–8.5)
RBC # BLD AUTO: 5.13 10*6/MM3 (ref 4.14–5.8)
SODIUM SERPL-SCNC: 136 MMOL/L (ref 136–145)
TRIGL SERPL-MCNC: 113 MG/DL (ref 0–150)
TSH SERPL DL<=0.05 MIU/L-ACNC: 2.58 UIU/ML (ref 0.27–4.2)
VLDLC SERPL-MCNC: 20 MG/DL (ref 5–40)
WBC # BLD AUTO: 4.45 10*3/MM3 (ref 3.4–10.8)

## 2021-07-09 PROCEDURE — 84443 ASSAY THYROID STIM HORMONE: CPT | Performed by: FAMILY MEDICINE

## 2021-07-09 PROCEDURE — 99213 OFFICE O/P EST LOW 20 MIN: CPT | Performed by: FAMILY MEDICINE

## 2021-07-09 PROCEDURE — 85025 COMPLETE CBC W/AUTO DIFF WBC: CPT | Performed by: FAMILY MEDICINE

## 2021-07-09 PROCEDURE — 36415 COLL VENOUS BLD VENIPUNCTURE: CPT

## 2021-07-09 PROCEDURE — 80061 LIPID PANEL: CPT | Performed by: FAMILY MEDICINE

## 2021-07-09 PROCEDURE — 80053 COMPREHEN METABOLIC PANEL: CPT | Performed by: FAMILY MEDICINE

## 2021-07-09 RX ORDER — ATORVASTATIN CALCIUM 40 MG/1
40 TABLET, FILM COATED ORAL DAILY
COMMUNITY
Start: 2021-06-18 | End: 2021-07-12 | Stop reason: SDUPTHER

## 2021-07-09 NOTE — PROGRESS NOTES
"Chief Complaint  Atrial Fibrillation and Hyperlipidemia    Subjective          Ashkan Farley presents to [facility]  Doing well.  No a-fib.   Doing well.        Objective   Vital Signs:   /90 (BP Location: Right arm, Patient Position: Sitting, Cuff Size: Adult)   Pulse 72   Temp 99.3 °F (37.4 °C) (Oral)   Resp 20   Ht 185.4 cm (73\")   Wt 105 kg (231 lb)   SpO2 99%   BMI 30.48 kg/m²     Physical Exam  Constitutional:       Appearance: Normal appearance. He is well-developed and normal weight.   HENT:      Head: Normocephalic and atraumatic.      Right Ear: Tympanic membrane, ear canal and external ear normal.      Left Ear: Tympanic membrane, ear canal and external ear normal.      Nose: Nose normal.      Mouth/Throat:      Mouth: Mucous membranes are moist.      Pharynx: Oropharynx is clear. No oropharyngeal exudate.   Eyes:      Extraocular Movements: Extraocular movements intact.      Conjunctiva/sclera: Conjunctivae normal.      Pupils: Pupils are equal, round, and reactive to light.   Cardiovascular:      Rate and Rhythm: Normal rate and regular rhythm.      Pulses: Normal pulses.      Heart sounds: Normal heart sounds.   Pulmonary:      Effort: Pulmonary effort is normal.      Breath sounds: Normal breath sounds.   Abdominal:      General: Bowel sounds are normal.      Palpations: Abdomen is soft.   Musculoskeletal:         General: Normal range of motion.      Cervical back: Normal range of motion and neck supple.   Skin:     General: Skin is warm and dry.   Neurological:      General: No focal deficit present.      Mental Status: He is alert and oriented to person, place, and time. Mental status is at baseline.   Psychiatric:         Mood and Affect: Mood normal.         Behavior: Behavior normal.         Thought Content: Thought content normal.         Judgment: Judgment normal.        Result Review :                 Assessment and Plan    Diagnoses and all " orders for this visit:    1. New onset atrial fibrillation (CMS/HCC) (Primary)  Assessment & Plan:  Cont Rx.        2. Hyperlipidemia LDL goal <70  Assessment & Plan:  Recheck today      3. Dysthymia  Assessment & Plan:  Doing well.        Follow Up   No follow-ups on file.  Patient was given instructions and counseling regarding his condition or for health maintenance advice. Please see specific information pulled into the AVS if appropriate.

## 2021-07-11 NOTE — PROGRESS NOTES
Tell Ashkan to continue following a low-carb diet and taking the atorvastatin.  It seems to be working quite well and his numbers are all nearly normal now.  Tell him he is doing a good job and keep it up.  Recheck numbers in 6 months.

## 2021-07-12 ENCOUNTER — OFFICE VISIT (OUTPATIENT)
Dept: CARDIOLOGY | Facility: CLINIC | Age: 46
End: 2021-07-12

## 2021-07-12 VITALS
BODY MASS INDEX: 31.14 KG/M2 | OXYGEN SATURATION: 100 % | SYSTOLIC BLOOD PRESSURE: 130 MMHG | HEIGHT: 73 IN | HEART RATE: 64 BPM | DIASTOLIC BLOOD PRESSURE: 82 MMHG | WEIGHT: 235 LBS

## 2021-07-12 DIAGNOSIS — R00.2 PALPITATIONS: Primary | ICD-10-CM

## 2021-07-12 DIAGNOSIS — I48.0 PAROXYSMAL ATRIAL FIBRILLATION (HCC): ICD-10-CM

## 2021-07-12 DIAGNOSIS — E78.00 PURE HYPERCHOLESTEROLEMIA: ICD-10-CM

## 2021-07-12 PROCEDURE — 99213 OFFICE O/P EST LOW 20 MIN: CPT | Performed by: INTERNAL MEDICINE

## 2021-07-12 PROCEDURE — 93000 ELECTROCARDIOGRAM COMPLETE: CPT | Performed by: INTERNAL MEDICINE

## 2021-07-12 RX ORDER — ATORVASTATIN CALCIUM 40 MG/1
40 TABLET, FILM COATED ORAL DAILY
Qty: 90 TABLET | Refills: 3 | Status: SHIPPED | OUTPATIENT
Start: 2021-07-12 | End: 2021-07-20

## 2021-07-12 NOTE — PROGRESS NOTES
Sub--Patient presented to [facility] 3/3/2021 with report of palpitations that began approximately 1 hour prior.  He reports associated diaphoresis.  He denies any actual chest pain, pressure, tightness.  He denies any shortness of breath, lower extremity edema, dizziness or lightheadedness.  Patient did state he has had palpitations in the past, thought to be due to anxiety.  Hence he was started on sertraline. Patient was given 20 mg bolus of IV Cardizem and started on Cardizem drip.  He is on treatment dose Lovenox.  He was also started on full dose aspirin.  TSH 5.22, troponin negative x2, D-dimer negative.  2D echocardiogram and stress testing were ordered.  In nuclear stress lab, patient's heart rate 111-115.  After Lexiscan administered, heart rate elevated to 181.  He was relatively asymptomatic.  Patient was given 2.5 mg IV Lopressor with improvement in heart rate.  He remained in A. fib.  Patient reports father has history of A. fib as well. Nuclear stress test performed and demonstrated abnormal perfusion with stress involving the inferior wall suggestive of ischemia. Cath without any sig disease.No recurrent AF since admission  No sleep apnea sx  Had AF 4 years ago.  No new sx since last visit          Past Medical History:   Diagnosis Date   • Atrial fibrillation (CMS/HCC)    • Depression      Past Surgical History:   Procedure Laterality Date   • CARDIAC CATHETERIZATION N/A 3/5/2021    Procedure: LEFT HEART CATH with possible PCI;  Surgeon: Rick Severino DO;  Location: [facility] CATH INVASIVE LOCATION;  Service: Cardiology;  Laterality: N/A;  Local and IV sedation   • CARDIAC CATHETERIZATION N/A 3/5/2021    Procedure: Coronary angiography;  Surgeon: Rick Severino DO;  Location: [facility] CATH INVASIVE LOCATION;  Service: Cardiology;  Laterality: N/A;   • CARDIAC CATHETERIZATION N/A 3/5/2021    Procedure: Left ventriculography;  Surgeon: iRck Severino DO;   Location: [facility] CATH INVASIVE LOCATION;  Service: Cardiology;  Laterality: N/A;   • SHOULDER SURGERY       Family History   Problem Relation Age of Onset   • Heart disease Father    • Atrial fibrillation Father      Review of Systems   General:  No for fatigue and tiredness  Eyes: No redness  Cardiovascular: No chest pain, no palpitations        Physical Exam    General:      well developed, well nourished, in no acute distress.    Head:      normocephalic and atraumatic.    Eyes:      PERRL/EOM intact, conjunctiva and sclera clear with out nystagmus.    Neck:      no masses, thyromegaly, trachea central with normal respiratory effort  Lungs:      clear bilaterally to auscultation.    Heart:    regular rate and rhythm, S1, S2 without murmurs, rubs, or gallops      A/P    PAF  Hyperlipidemia on statins--refill for lipitor given  Recent labs reviewed--- RECENTLY  Smart watch apps REVIEWED  AF RX options educated  Had Peptic ulcer--off aspirin  Patient educated about dietary Modification for hyperlipidemia and hypercholesterolemia  Follow up in 6 months  meds reviewed.        ECG 12 Lead    Date/Time: 7/12/2021 8:49 AM  Performed by: Naren Darby MD  Authorized by: Naren Darby MD   Comparison: compared with previous ECG   Similar to previous ECG  Rhythm: sinus rhythm  Rate: normal  Conduction: conduction normal  QRS axis: normal              Electronically signed by Naren Darby MD, 07/12/21, 8:49 AM EDT.

## 2021-07-20 RX ORDER — ATORVASTATIN CALCIUM 40 MG/1
TABLET, FILM COATED ORAL
Qty: 30 TABLET | Refills: 0 | Status: SHIPPED | OUTPATIENT
Start: 2021-07-20 | End: 2022-02-07 | Stop reason: SDUPTHER

## 2021-08-04 RX ORDER — SERTRALINE HYDROCHLORIDE 100 MG/1
TABLET, FILM COATED ORAL
Qty: 30 TABLET | Refills: 0 | Status: SHIPPED | OUTPATIENT
Start: 2021-08-04 | End: 2021-09-03

## 2021-09-03 RX ORDER — SERTRALINE HYDROCHLORIDE 100 MG/1
TABLET, FILM COATED ORAL
Qty: 90 TABLET | Refills: 0 | Status: SHIPPED | OUTPATIENT
Start: 2021-09-03 | End: 2021-12-20

## 2021-12-20 RX ORDER — SERTRALINE HYDROCHLORIDE 100 MG/1
TABLET, FILM COATED ORAL
Qty: 90 TABLET | Refills: 1 | Status: SHIPPED | OUTPATIENT
Start: 2021-12-20 | End: 2022-05-09

## 2022-01-17 ENCOUNTER — OFFICE VISIT (OUTPATIENT)
Dept: CARDIOLOGY | Facility: CLINIC | Age: 47
End: 2022-01-17

## 2022-01-17 VITALS
OXYGEN SATURATION: 98 % | DIASTOLIC BLOOD PRESSURE: 84 MMHG | SYSTOLIC BLOOD PRESSURE: 129 MMHG | HEIGHT: 73 IN | BODY MASS INDEX: 31.41 KG/M2 | WEIGHT: 237 LBS | HEART RATE: 64 BPM

## 2022-01-17 DIAGNOSIS — R00.2 PALPITATIONS: ICD-10-CM

## 2022-01-17 DIAGNOSIS — I48.0 PAROXYSMAL ATRIAL FIBRILLATION: Primary | ICD-10-CM

## 2022-01-17 DIAGNOSIS — E78.00 PURE HYPERCHOLESTEROLEMIA: ICD-10-CM

## 2022-01-17 PROCEDURE — 93000 ELECTROCARDIOGRAM COMPLETE: CPT | Performed by: INTERNAL MEDICINE

## 2022-01-17 PROCEDURE — 99213 OFFICE O/P EST LOW 20 MIN: CPT | Performed by: INTERNAL MEDICINE

## 2022-01-17 NOTE — PROGRESS NOTES
Sub--Patient presented to [facility] 3/3/2021 with report of palpitations that began approximately 1 hour prior.  He reports associated diaphoresis.  He denies any actual chest pain, pressure, tightness.  He denies any shortness of breath, lower extremity edema, dizziness or lightheadedness.  Patient did state he has had palpitations in the past, thought to be due to anxiety.  Hence he was started on sertraline. Patient was given 20 mg bolus of IV Cardizem and started on Cardizem drip.  He is on treatment dose Lovenox.  He was also started on full dose aspirin.  TSH 5.22, troponin negative x2, D-dimer negative.  2D echocardiogram and stress testing were ordered.  In nuclear stress lab, patient's heart rate 111-115.  After Lexiscan administered, heart rate elevated to 181.  He was relatively asymptomatic.  Patient was given 2.5 mg IV Lopressor with improvement in heart rate.  He remained in A. fib.  Patient reports father has history of A. fib as well. Nuclear stress test performed and demonstrated abnormal perfusion with stress involving the inferior wall suggestive of ischemia. Cath without any sig disease.No recurrent AF since admission  No sleep apnea sx  Had AF 4 years ago.  No new sx since last visit          Past Medical History:   Diagnosis Date   • Atrial fibrillation (CMS/HCC)    • Depression      Past Surgical History:   Procedure Laterality Date   • CARDIAC CATHETERIZATION N/A 3/5/2021    Procedure: LEFT HEART CATH with possible PCI;  Surgeon: Rick Severino DO;  Location: [facility] CATH INVASIVE LOCATION;  Service: Cardiology;  Laterality: N/A;  Local and IV sedation   • CARDIAC CATHETERIZATION N/A 3/5/2021    Procedure: Coronary angiography;  Surgeon: Rick Severino DO;  Location: [facility] CATH INVASIVE LOCATION;  Service: Cardiology;  Laterality: N/A;   • CARDIAC CATHETERIZATION N/A 3/5/2021    Procedure: Left ventriculography;  Surgeon: Rick Severino DO;   Location: [facility] CATH INVASIVE LOCATION;  Service: Cardiology;  Laterality: N/A;   • SHOULDER SURGERY       Family History   Problem Relation Age of Onset   • Heart disease Father    • Atrial fibrillation Father      Review of Systems   General:  No for fatigue and tiredness  Eyes: No redness  Cardiovascular: No chest pain, no palpitations        Physical Exam    General:      well developed, well nourished, in no acute distress.    Head:      normocephalic and atraumatic.    Eyes:      PERRL/EOM intact, conjunctiva and sclera clear with out nystagmus.    Neck:      no masses, thyromegaly, trachea central with normal respiratory effort  Lungs:      clear bilaterally to auscultation.    Heart:    regular rate and rhythm, S1, S2 without murmurs, rubs, or gallops      A/P    PAF  Hyperlipidemia on statins--refill for lipitor given  Recent labs reviewed--- RECENTLY  Smart watch apps REVIEWED  AF RX options educated  Had Peptic ulcer--off aspirin  Patient educated about dietary Modification for hyperlipidemia and hypercholesterolemia  Follow up in 12 months  meds reviewed.      ECG 12 Lead    Date/Time: 1/17/2022 8:51 AM  Performed by: Naren Darby MD  Authorized by: Naren Darby MD   Comparison: compared with previous ECG   Similar to previous ECG  Rhythm: sinus rhythm  Rate: normal  Conduction: conduction normal  QRS axis: normal            Electronically signed by Naren Darby MD, 01/17/22, 8:51 AM EST.

## 2022-02-07 RX ORDER — ATORVASTATIN CALCIUM 40 MG/1
40 TABLET, FILM COATED ORAL DAILY
Qty: 90 TABLET | Refills: 3 | Status: SHIPPED | OUTPATIENT
Start: 2022-02-07 | End: 2023-03-07

## 2022-02-21 ENCOUNTER — TELEPHONE (OUTPATIENT)
Dept: FAMILY MEDICINE CLINIC | Facility: CLINIC | Age: 47
End: 2022-02-21

## 2022-02-21 NOTE — TELEPHONE ENCOUNTER
Caller: Swathi Farley    Relationship to patient: Self    Best call back number:[phone]     Patient is needing:     SWATHI WOULD LIKE TO KNOW IF HE IS NEEDING PSA TEST DURING HIS UPCOMING PHYSICAL 8/2022    PLEASE ADVISE

## 2022-05-09 RX ORDER — SERTRALINE HYDROCHLORIDE 100 MG/1
TABLET, FILM COATED ORAL
Qty: 90 TABLET | Refills: 2 | Status: SHIPPED | OUTPATIENT
Start: 2022-05-09 | End: 2022-06-16 | Stop reason: SDUPTHER

## 2022-06-17 RX ORDER — SERTRALINE HYDROCHLORIDE 100 MG/1
100 TABLET, FILM COATED ORAL DAILY
Qty: 90 TABLET | Refills: 2 | Status: SHIPPED | OUTPATIENT
Start: 2022-06-17

## 2022-08-15 ENCOUNTER — LAB (OUTPATIENT)
Dept: FAMILY MEDICINE CLINIC | Facility: CLINIC | Age: 47
End: 2022-08-15

## 2022-08-15 ENCOUNTER — OFFICE VISIT (OUTPATIENT)
Dept: FAMILY MEDICINE CLINIC | Facility: CLINIC | Age: 47
End: 2022-08-15

## 2022-08-15 VITALS
BODY MASS INDEX: 29.82 KG/M2 | TEMPERATURE: 97.6 F | HEART RATE: 60 BPM | SYSTOLIC BLOOD PRESSURE: 130 MMHG | DIASTOLIC BLOOD PRESSURE: 80 MMHG | OXYGEN SATURATION: 99 % | WEIGHT: 225 LBS | HEIGHT: 73 IN | RESPIRATION RATE: 16 BRPM

## 2022-08-15 DIAGNOSIS — M75.121 NONTRAUMATIC COMPLETE TEAR OF RIGHT ROTATOR CUFF: ICD-10-CM

## 2022-08-15 DIAGNOSIS — I48.91 NEW ONSET ATRIAL FIBRILLATION: ICD-10-CM

## 2022-08-15 DIAGNOSIS — Z00.00 PREVENTATIVE HEALTH CARE: Primary | ICD-10-CM

## 2022-08-15 DIAGNOSIS — K29.00 OTHER ACUTE GASTRITIS WITHOUT HEMORRHAGE: ICD-10-CM

## 2022-08-15 DIAGNOSIS — E55.9 VITAMIN D DEFICIENCY: ICD-10-CM

## 2022-08-15 DIAGNOSIS — E78.5 HYPERLIPIDEMIA LDL GOAL <70: ICD-10-CM

## 2022-08-15 DIAGNOSIS — Z12.5 SCREENING FOR PROSTATE CANCER: ICD-10-CM

## 2022-08-15 LAB
25(OH)D3 SERPL-MCNC: 39.5 NG/ML (ref 30–100)
ALBUMIN SERPL-MCNC: 4.6 G/DL (ref 3.5–5.2)
ALBUMIN/GLOB SERPL: 1.8 G/DL
ALP SERPL-CCNC: 56 U/L (ref 39–117)
ALT SERPL W P-5'-P-CCNC: 17 U/L (ref 1–41)
ANION GAP SERPL CALCULATED.3IONS-SCNC: 10.4 MMOL/L (ref 5–15)
AST SERPL-CCNC: 17 U/L (ref 1–40)
BASOPHILS # BLD AUTO: 0.04 10*3/MM3 (ref 0–0.2)
BASOPHILS NFR BLD AUTO: 1.1 % (ref 0–1.5)
BILIRUB SERPL-MCNC: 0.5 MG/DL (ref 0–1.2)
BILIRUB UR QL STRIP: NEGATIVE
BUN SERPL-MCNC: 13 MG/DL (ref 6–20)
BUN/CREAT SERPL: 12.7 (ref 7–25)
CALCIUM SPEC-SCNC: 9.3 MG/DL (ref 8.6–10.5)
CHLORIDE SERPL-SCNC: 101 MMOL/L (ref 98–107)
CHOLEST SERPL-MCNC: 133 MG/DL (ref 0–200)
CLARITY UR: CLEAR
CO2 SERPL-SCNC: 25.6 MMOL/L (ref 22–29)
COLOR UR: YELLOW
CREAT SERPL-MCNC: 1.02 MG/DL (ref 0.76–1.27)
DEPRECATED RDW RBC AUTO: 42.1 FL (ref 37–54)
EGFRCR SERPLBLD CKD-EPI 2021: 92.4 ML/MIN/1.73
EOSINOPHIL # BLD AUTO: 0.11 10*3/MM3 (ref 0–0.4)
EOSINOPHIL NFR BLD AUTO: 2.9 % (ref 0.3–6.2)
ERYTHROCYTE [DISTWIDTH] IN BLOOD BY AUTOMATED COUNT: 12.7 % (ref 12.3–15.4)
GLOBULIN UR ELPH-MCNC: 2.5 GM/DL
GLUCOSE SERPL-MCNC: 92 MG/DL (ref 65–99)
GLUCOSE UR STRIP-MCNC: NEGATIVE MG/DL
HBA1C MFR BLD: 5.3 % (ref 3.5–5.6)
HCT VFR BLD AUTO: 43 % (ref 37.5–51)
HDLC SERPL-MCNC: 47 MG/DL (ref 40–60)
HGB BLD-MCNC: 14.5 G/DL (ref 13–17.7)
HGB UR QL STRIP.AUTO: NEGATIVE
IMM GRANULOCYTES # BLD AUTO: 0.01 10*3/MM3 (ref 0–0.05)
IMM GRANULOCYTES NFR BLD AUTO: 0.3 % (ref 0–0.5)
KETONES UR QL STRIP: NEGATIVE
LDLC SERPL CALC-MCNC: 70 MG/DL (ref 0–100)
LDLC/HDLC SERPL: 1.47 {RATIO}
LEUKOCYTE ESTERASE UR QL STRIP.AUTO: NEGATIVE
LYMPHOCYTES # BLD AUTO: 0.91 10*3/MM3 (ref 0.7–3.1)
LYMPHOCYTES NFR BLD AUTO: 23.9 % (ref 19.6–45.3)
MCH RBC QN AUTO: 30.6 PG (ref 26.6–33)
MCHC RBC AUTO-ENTMCNC: 33.7 G/DL (ref 31.5–35.7)
MCV RBC AUTO: 90.7 FL (ref 79–97)
MONOCYTES # BLD AUTO: 0.3 10*3/MM3 (ref 0.1–0.9)
MONOCYTES NFR BLD AUTO: 7.9 % (ref 5–12)
NEUTROPHILS NFR BLD AUTO: 2.43 10*3/MM3 (ref 1.7–7)
NEUTROPHILS NFR BLD AUTO: 63.9 % (ref 42.7–76)
NITRITE UR QL STRIP: NEGATIVE
NRBC BLD AUTO-RTO: 0 /100 WBC (ref 0–0.2)
PH UR STRIP.AUTO: 6 [PH] (ref 5–8)
PLATELET # BLD AUTO: 228 10*3/MM3 (ref 140–450)
PMV BLD AUTO: 10.2 FL (ref 6–12)
POTASSIUM SERPL-SCNC: 4.1 MMOL/L (ref 3.5–5.2)
PROT SERPL-MCNC: 7.1 G/DL (ref 6–8.5)
PROT UR QL STRIP: NEGATIVE
PSA SERPL-MCNC: 0.33 NG/ML (ref 0–4)
RBC # BLD AUTO: 4.74 10*6/MM3 (ref 4.14–5.8)
SODIUM SERPL-SCNC: 137 MMOL/L (ref 136–145)
SP GR UR STRIP: 1.01 (ref 1–1.03)
T4 FREE SERPL-MCNC: 1.14 NG/DL (ref 0.93–1.7)
TRIGL SERPL-MCNC: 85 MG/DL (ref 0–150)
TSH SERPL DL<=0.05 MIU/L-ACNC: 2.06 UIU/ML (ref 0.27–4.2)
UROBILINOGEN UR QL STRIP: NORMAL
VIT B12 BLD-MCNC: 325 PG/ML (ref 211–946)
VLDLC SERPL-MCNC: 16 MG/DL (ref 5–40)
WBC NRBC COR # BLD: 3.8 10*3/MM3 (ref 3.4–10.8)

## 2022-08-15 PROCEDURE — 82607 VITAMIN B-12: CPT | Performed by: FAMILY MEDICINE

## 2022-08-15 PROCEDURE — 81003 URINALYSIS AUTO W/O SCOPE: CPT | Performed by: FAMILY MEDICINE

## 2022-08-15 PROCEDURE — 36415 COLL VENOUS BLD VENIPUNCTURE: CPT | Performed by: FAMILY MEDICINE

## 2022-08-15 PROCEDURE — 80050 GENERAL HEALTH PANEL: CPT | Performed by: FAMILY MEDICINE

## 2022-08-15 PROCEDURE — 82306 VITAMIN D 25 HYDROXY: CPT | Performed by: FAMILY MEDICINE

## 2022-08-15 PROCEDURE — 80061 LIPID PANEL: CPT | Performed by: FAMILY MEDICINE

## 2022-08-15 PROCEDURE — 84439 ASSAY OF FREE THYROXINE: CPT | Performed by: FAMILY MEDICINE

## 2022-08-15 PROCEDURE — 83036 HEMOGLOBIN GLYCOSYLATED A1C: CPT | Performed by: FAMILY MEDICINE

## 2022-08-15 PROCEDURE — 99396 PREV VISIT EST AGE 40-64: CPT | Performed by: FAMILY MEDICINE

## 2022-08-15 PROCEDURE — G0103 PSA SCREENING: HCPCS | Performed by: FAMILY MEDICINE

## 2022-08-15 NOTE — PROGRESS NOTES
"Chief Complaint  Annual Exam    Subjective      Ashkan Farley presents to [facility]  For annual exam.    The patient presents today for a routine follow-up. He states he is doing well and denies significant pain or new complaints today.    Health maintenance  The patient states that he has not had any episodes of atrial fibrillation since his last visit. He notes that he has been following up with his cardiologist annually. He states that he is not taking any medication for his atrial fibrillation.    The patient states that his right shoulder is doing well. He notes that he had a rotator cuff repair on his right shoulder. He states that he is able to throw objects. He notes that his back is doing well.    The patient states that he is scheduled for a colonoscopy later in the year. He notes that he has never been constipated.    The patient states that he goes out a couple of times per week and plays tennis. He states he enjoys lifting weights. He notes that he sleeps well at night. He states that he feels good most mornings. He denies any problems with shoulders, knees or ankles.    The patient states that his father has irritable bowel syndrome. He denies any family history of colon cancer.    Objective   Vital Signs:  /80 (BP Location: Left arm)   Pulse 60   Temp 97.6 °F (36.4 °C) (Infrared)   Resp 16   Ht 185.4 cm (73\")   Wt 102 kg (225 lb)   SpO2 99%   BMI 29.69 kg/m²   Estimated body mass index is 29.69 kg/m² as calculated from the following:    Height as of this encounter: 185.4 cm (73\").    Weight as of this encounter: 102 kg (225 lb).    BMI is >= 25 and <30. (Overweight) The following options were offered after discussion;: weight loss educational material (shared in after visit summary), exercise counseling/recommendations and nutrition counseling/recommendations      Physical Exam  Constitutional:       Appearance: Normal appearance. He is well-developed and " normal weight.   HENT:      Head: Normocephalic and atraumatic.      Right Ear: Tympanic membrane, ear canal and external ear normal.      Left Ear: Tympanic membrane, ear canal and external ear normal.      Nose: Nose normal.      Mouth/Throat:      Mouth: Mucous membranes are moist.      Pharynx: Oropharynx is clear. No oropharyngeal exudate.   Eyes:      Extraocular Movements: Extraocular movements intact.      Conjunctiva/sclera: Conjunctivae normal.      Pupils: Pupils are equal, round, and reactive to light.   Cardiovascular:      Rate and Rhythm: Normal rate and regular rhythm.      Pulses: Normal pulses.      Heart sounds: Normal heart sounds.   Pulmonary:      Effort: Pulmonary effort is normal.      Breath sounds: Normal breath sounds.   Abdominal:      General: Bowel sounds are normal.      Palpations: Abdomen is soft.   Musculoskeletal:         General: Normal range of motion.      Cervical back: Normal range of motion and neck supple.   Skin:     General: Skin is warm and dry.   Neurological:      General: No focal deficit present.      Mental Status: He is alert and oriented to person, place, and time. Mental status is at baseline.   Psychiatric:         Mood and Affect: Mood normal.         Behavior: Behavior normal.         Thought Content: Thought content normal.         Judgment: Judgment normal.        Assessment and Plan     1. Preventative healthcare  - Ashkan is in today for his preventative healthcare visit. He is 47-years-old and overall doing well. He is not going to be set up for a colonoscopy just yet. He does not have any strong family history of colon polyps or cancer, so I think we can probably wait on Ashkan until he is 52. We will get blood work today though and check his lipids, kidney and liver function tests to make sure he is normal there. He works full-time and is under some stress at work, but basically is doing well. He does need to get some exercise going and so he is going to  start an exercise program probably in the mornings before he goes to work and he is going to do that 3 to 4 days a week. It is going to consist of some cardiovascular exercising, maybe some step work, and then also some light weights. His appetite is good. His bowel function is good. His review of systems was completely normal.    2. New onset atrial fibrillation  - Last year Ashkan was found to be in atrial fibrillation. I sent him to Cardiology and he spontaneously converted to sinus rhythm on his own. He has not had any further bouts of atrial fibrillation since that time. His cardiac work-up was negative and we are just simply watching now for any signs or symptoms of recurrence.    3. Hyperlipidemia  - He had a very high cholesterol several years ago and he was placed on a statin. The statin brought him down to normal very quickly and we are going to recheck that again this year. I think he will always have to stay on a statin, but a low carb diet, statin, and exercise seems to be able to control this problem very nicely.    4. Vitamin D deficiency  - The patient is on some vitamin D supplementation. We will check his level again today.    5. Previously a nontraumatic complete tear of the right rotator cuff  - This was surgically repaired and he can function now with the shoulder quite well. He can throw a baseball with his kids and not have any pain. He cannot throw hard, but he can throw well.    6. Acute gastritis without hemorrhage  - The patient has had a history of some gastritis and will often take over the counter Pepcid or Prevacid if needed. For the most part, this is just on an as needed basis. He uses some antacids at times, but if it is persistent, he will get something like Pepcid or Prevacid.    7. Screening test for colon cancer  - The patient's last PSA was about 3 years ago. We will go ahead and repeat that today, but we anticipate it being normal.        Follow Up     Return in about 1 year  (around 8/22/2023) for Annual physical with labs the week before.  Patient was given instructions and counseling regarding his condition or for health maintenance advice. Please see specific information pulled into the AVS if appropriate.     Transcribed from ambient dictation for Brando Piper MD by DENA FIELDS.  08/15/22   11:46 EDT    Patient verbalized consent to the visit recording.  I have personally performed the services described in this document as transcribed by the above individual, and it is both accurate and complete.  Brando Piper MD  8/22/2022  17:20 EDT

## 2022-08-16 NOTE — PROGRESS NOTES
Keila tell Ashkan that he has almost perfect labs.  These are really excellent so keep doing what you are doing other than lets add in the exercises we talked about.  These are really amazingly good labs

## 2022-10-05 NOTE — PROGRESS NOTES
Discharge Planning Assessment   Victor Manuel     Patient Name: Ashkan Farley  MRN: [id number]  Today's Date: 3/5/2021    Admit Date: 3/3/2021    Discharge Needs Assessment     Row Name 03/05/21 1319       Living Environment    Lives With  spouse    Name(s) of Who Lives With Patient  Spouse- Lynda    Current Living Arrangements  home/apartment/condo    Primary Care Provided by  self    Provides Primary Care For  no one    Family Caregiver if Needed  spouse    Quality of Family Relationships  supportive;involved;helpful    Able to Return to Prior Arrangements  yes       Resource/Environmental Concerns    Resource/Environmental Concerns  none    Transportation Concerns  car, none       Transition Planning    Patient/Family Anticipates Transition to  home with family    Patient/Family Anticipated Services at Transition  none    Transportation Anticipated  family or friend will provide;car, drives self       Discharge Needs Assessment    Readmission Within the Last 30 Days  no previous admission in last 30 days    Equipment Currently Used at Home  none    Concerns to be Addressed  no discharge needs identified;denies needs/concerns at this time    Anticipated Changes Related to Illness  none    Equipment Needed After Discharge  none    Provided Post Acute Provider List?  Refused        Discharge Plan     Row Name 03/05/21 1320       Plan    Plan  DC Plan: Anticipate routine home, denies needs at this time.    Patient/Family in Agreement with Plan  yes    Plan Comments  CM met with patient and spouse at bedside to discuss dc planning. PCP confirmed- Brando Piper, preferred pharmacy confirmed- [facility], reports no trouble affording medications at this time. DC Barriers: Patient had cardiac catheterization performed today, anticipate dc home once cleared by cardiology.          Demographic Summary     Row Name 03/05/21 1314       General Information    Admission Type  observation    Reason for Consult  discharge  planning    Preferred Language  English     Used During This Interaction  no       Contact Information    Permission Granted to Share Info With          Functional Status     Row Name 03/05/21 1319       Functional Status    Usual Activity Tolerance  excellent    Current Activity Tolerance  good       Functional Status, IADL    Medications  independent    Meal Preparation  independent    Housekeeping  independent    Laundry  independent    Shopping  independent        Met with patient in room wearing PPE: mask/goggles.      Maintained distance greater than six feet and spent less than 15 minutes in the room.      Lulu Sue RN     Office Phone: [phone]  Office Cell: [phone]     Attending Attestation (For Attendings USE Only)...

## 2022-10-24 ENCOUNTER — ANESTHESIA EVENT (OUTPATIENT)
Dept: GASTROENTEROLOGY | Facility: HOSPITAL | Age: 47
End: 2022-10-24

## 2022-10-24 RX ORDER — SODIUM CHLORIDE 9 MG/ML
9 INJECTION, SOLUTION INTRAVENOUS CONTINUOUS PRN
Status: CANCELLED | OUTPATIENT
Start: 2022-10-24

## 2022-10-24 RX ORDER — MIDAZOLAM HYDROCHLORIDE 1 MG/ML
1 INJECTION INTRAMUSCULAR; INTRAVENOUS
Status: CANCELLED | OUTPATIENT
Start: 2022-10-24

## 2022-10-24 RX ORDER — SODIUM CHLORIDE 0.9 % (FLUSH) 0.9 %
10 SYRINGE (ML) INJECTION EVERY 12 HOURS SCHEDULED
Status: CANCELLED | OUTPATIENT
Start: 2022-10-24

## 2022-10-24 RX ORDER — SODIUM CHLORIDE 0.9 % (FLUSH) 0.9 %
10 SYRINGE (ML) INJECTION AS NEEDED
Status: CANCELLED | OUTPATIENT
Start: 2022-10-24

## 2022-10-25 ENCOUNTER — ANESTHESIA (OUTPATIENT)
Dept: GASTROENTEROLOGY | Facility: HOSPITAL | Age: 47
End: 2022-10-25

## 2022-10-25 ENCOUNTER — HOSPITAL ENCOUNTER (OUTPATIENT)
Facility: HOSPITAL | Age: 47
Setting detail: HOSPITAL OUTPATIENT SURGERY
Discharge: HOME | End: 2022-10-25
Attending: INTERNAL MEDICINE | Admitting: INTERNAL MEDICINE

## 2022-10-25 ENCOUNTER — ON CAMPUS - OUTPATIENT (OUTPATIENT)
Dept: URBAN - METROPOLITAN AREA HOSPITAL 85 | Facility: HOSPITAL | Age: 47
End: 2022-10-25
Payer: COMMERCIAL

## 2022-10-25 VITALS
RESPIRATION RATE: 12 BRPM | SYSTOLIC BLOOD PRESSURE: 110 MMHG | HEART RATE: 53 BPM | HEIGHT: 73 IN | DIASTOLIC BLOOD PRESSURE: 67 MMHG | TEMPERATURE: 98 F | WEIGHT: 224.87 LBS | OXYGEN SATURATION: 99 % | BODY MASS INDEX: 29.8 KG/M2

## 2022-10-25 DIAGNOSIS — K62.1 RECTAL POLYP: ICD-10-CM

## 2022-10-25 DIAGNOSIS — Z12.11 COLON CANCER SCREENING: ICD-10-CM

## 2022-10-25 DIAGNOSIS — Z12.11 ENCOUNTER FOR SCREENING FOR MALIGNANT NEOPLASM OF COLON: ICD-10-CM

## 2022-10-25 PROCEDURE — 45380 COLONOSCOPY AND BIOPSY: CPT | Performed by: INTERNAL MEDICINE

## 2022-10-25 PROCEDURE — 25010000002 PROPOFOL 200 MG/20ML EMULSION: Performed by: ANESTHESIOLOGY

## 2022-10-25 PROCEDURE — 88305 TISSUE EXAM BY PATHOLOGIST: CPT | Performed by: INTERNAL MEDICINE

## 2022-10-25 RX ORDER — PROPOFOL 10 MG/ML
INJECTION, EMULSION INTRAVENOUS AS NEEDED
Status: DISCONTINUED | OUTPATIENT
Start: 2022-10-25 | End: 2022-10-25 | Stop reason: SURG

## 2022-10-25 RX ORDER — SODIUM CHLORIDE 0.9 % (FLUSH) 0.9 %
3 SYRINGE (ML) INJECTION EVERY 12 HOURS SCHEDULED
Status: DISCONTINUED | OUTPATIENT
Start: 2022-10-25 | End: 2022-10-25 | Stop reason: HOSPADM

## 2022-10-25 RX ORDER — ONDANSETRON 2 MG/ML
4 INJECTION INTRAMUSCULAR; INTRAVENOUS ONCE AS NEEDED
Status: DISCONTINUED | OUTPATIENT
Start: 2022-10-25 | End: 2022-10-25 | Stop reason: HOSPADM

## 2022-10-25 RX ORDER — SODIUM CHLORIDE 9 MG/ML
INJECTION, SOLUTION INTRAVENOUS CONTINUOUS PRN
Status: DISCONTINUED | OUTPATIENT
Start: 2022-10-25 | End: 2022-10-25 | Stop reason: SURG

## 2022-10-25 RX ORDER — SODIUM CHLORIDE 0.9 % (FLUSH) 0.9 %
10 SYRINGE (ML) INJECTION AS NEEDED
Status: DISCONTINUED | OUTPATIENT
Start: 2022-10-25 | End: 2022-10-25 | Stop reason: HOSPADM

## 2022-10-25 RX ADMIN — PROPOFOL 220 MG: 10 INJECTION, EMULSION INTRAVENOUS at 08:37

## 2022-10-25 RX ADMIN — SODIUM CHLORIDE: 0.9 INJECTION, SOLUTION INTRAVENOUS at 08:37

## 2022-10-25 NOTE — ANESTHESIA PREPROCEDURE EVALUATION
Anesthesia Evaluation     Patient summary reviewed and Nursing notes reviewed   NPO Solid Status: > 8 hours  NPO Liquid Status: > 8 hours           Airway   Mallampati: II  TM distance: >3 FB  Neck ROM: full  No difficulty expected  Dental - normal exam     Pulmonary    Cardiovascular     (+) dysrhythmias Paroxysmal Atrial Fib, hyperlipidemia,       Neuro/Psych  GI/Hepatic/Renal/Endo    (+)  PUD,      Musculoskeletal     Abdominal    Substance History      OB/GYN          Other   arthritis,                      Anesthesia Plan    ASA 3     MAC     intravenous induction     Anesthetic plan, risks, benefits, and alternatives have been provided, discussed and informed consent has been obtained with: patient.        CODE STATUS:

## 2022-10-25 NOTE — DISCHARGE INSTRUCTIONS
A responsible adult should stay with you and you should rest quietly for the rest of the day.    Do not drink alcohol, drive, operate any heavy machinery or power tools or make any legal/important decisions for the next 24 hours.     Progress your diet as tolerated.  If you begin to experience severe pain, increased shortness of breath, racing heartbeat or a fever above 101 F, seek immediate medical attention.     Follow up with MD as instructed. Call office for results in 5 days if needed.

## 2022-10-25 NOTE — OP NOTE
COLONOSCOPY Procedure Report    Patient Name:  Ashkan Farley  YOB: 1975    Date of Surgery:  10/25/2022     Pre-Op Diagnosis:  Colon cancer screening [Z12.11]       Postop diagnosis:  1.  Colon polyp    Procedure/CPT® Codes:      Procedure(s):  COLONOSCOPY with biopsy    Staff:  Surgeon(s):  Nikolay Leahy MD      Anesthesia: Monitored Anesthesia Care    Description of Procedure:  A description of the procedure as well as risks, benefits and alternative methods were explained to the patient who voiced understanding and signed the corresponding consent form. A physical exam was performed and vital signs were monitored throughout the procedure.    A rectal exam was performed which was normal. An Olympus colonoscope was placed into the rectum and proceeded under direct visualization through the colon until the cecum and appendiceal orifice were identified. Careful visualization occurred upon slow withdraw of the scope. The scope was then retroflexed and the distal rectum was visualized. The quality of the prep was good. The procedure was not difficult and there were no immediate complications.  There was no blood loss.    Impression:  1. 2 polyps that were 1 to 2 mm and diminutive in the rectum removed via cold forcep biopsies and sent for histopathology  2.  Otherwise normal colonoscopy to the terminal ileum    Recommendations:  Repeat colonoscopy in 7 years if polyps are adenomatous and 10 years of polyps are hyperplastic      Nikolay Leahy MD     Date: 10/25/2022    Time: 08:55 EDT

## 2022-10-25 NOTE — H&P
"GI CONSULT  NOTE:    Referring Provider:    Brando Piper MD  [unfilled]    Chief complaint: <principal problem not specified>    Subjective .       Pre op diagnosis  Colon cancer screening [Z12.11]      History of present illness:      Ashkan Farley is a 47 y.o. male who presents today for Procedure(s):  COLONOSCOPY for the indications listed below.     The updated Patient Profile was reviewed prior to the procedure, in conjunction with the Physical Exam, including medical conditions, surgical procedures, medications, allergies, family history and social history.     Pre-operatively, I reviewed the indication(s) for the procedure, the risks of the procedure [including but not limited to: unexpected bleeding possibly requiring hospitalization and/or unplanned repeat procedures, perforation possibly requiring surgical treatment, missed lesions and complications of sedation/MAC (also explained by anesthesia staff)].     I have evaluated the patient for risks associated with the planned anesthesia and the procedure to be performed and find the patient an acceptable candidate for IV sedation.    Multiple opportunities were provided for any questions or concerns, and all questions were answered satisfactorily before any anesthesia was administered. We will proceed with the planned procedure.    Past Medical History:  Past Medical History:   Diagnosis Date   • Allergic     Arithamiason   • Atrial fibrillation (HCC)     \"comes and goes\"   • Depression    • Hyperlipidemia 3/3/21       Past Surgical History:  Past Surgical History:   Procedure Laterality Date   • CARDIAC CATHETERIZATION N/A 3/5/2021    Procedure: LEFT HEART CATH with possible PCI;  Surgeon: Rikc Severino DO;  Location: [facility] CATH INVASIVE LOCATION;  Service: Cardiology;  Laterality: N/A;  Local and IV sedation   • CARDIAC CATHETERIZATION N/A 3/5/2021    Procedure: Coronary angiography;  Surgeon: Rick Severino DO;  " Location: [facility] CATH INVASIVE LOCATION;  Service: Cardiology;  Laterality: N/A;   • CARDIAC CATHETERIZATION N/A 3/5/2021    Procedure: Left ventriculography;  Surgeon: Rick Severino DO;  Location: [facility] CATH INVASIVE LOCATION;  Service: Cardiology;  Laterality: N/A;   • SHOULDER SURGERY         Social History:  Social History     Tobacco Use   • Smoking status: Never   • Smokeless tobacco: Never   Vaping Use   • Vaping Use: Never used   Substance Use Topics   • Alcohol use: Yes     Alcohol/week: 7.0 standard drinks     Types: 4 Cans of beer, 3 Drinks containing 0.5 oz of alcohol per week     Comment: social   • Drug use: Never       Family History:  Family History   Problem Relation Age of Onset   • Heart disease Father    • Atrial fibrillation Father        Medications:  Medications Prior to Admission   Medication Sig Dispense Refill Last Dose   • atorvastatin (LIPITOR) 40 MG tablet Take 1 tablet by mouth Daily. 90 tablet 3 10/24/2022   • cholecalciferol (VITAMIN D3) 25 MCG (1000 UT) tablet Take 1,000 Units by mouth Daily.   10/24/2022   • sertraline (ZOLOFT) 100 MG tablet Take 1 tablet by mouth Daily. 90 tablet 2 10/24/2022       Scheduled Meds:  Continuous Infusions:No current facility-administered medications for this encounter.    PRN Meds:.    ALLERGIES:  Erythromycin    ROS:  The following systems were reviewed and negative;  Constitution:  No fevers, chills, no unintentional weight loss  Skin: no rash, no jaundice  Eyes:  No blurry vision, no eye pain  HENT:  No change in hearing or smell  Resp:  No dyspnea or cough  CV:  No chest pain or palpitations  :  No dysuria, hematuria  Musculoskeletal:  No leg cramps or arthralgias  Neuro:  No tremor, no numbness  Psych:  No depression or confsusion    Objective     Vital Signs:   Vitals:    10/17/22 1429 10/25/22 0815   BP:  139/76   BP Location:  Left arm   Patient Position:  Lying   Pulse:  61   Resp:  14   Temp:  98 °F (36.7 °C)   TempSrc:   "Oral   SpO2:  99%   Weight: 102 kg (225 lb) 102 kg (224 lb 13.9 oz)   Height: 185.4 cm (73\") 185.4 cm (73\")       Physical Exam:       General Appearance:    Awake and alert, in no acute distress   Head:    Normocephalic, without obvious abnormality, atraumatic   Throat:   No oral lesions, no thrush, oral mucosa moist   Lungs:     respirations regular, even and unlabored   Skin:   No rash, no jaundice       Results Review:  Lab Results (last 24 hours)     ** No results found for the last 24 hours. **          Imaging Results (Last 24 Hours)     ** No results found for the last 24 hours. **           I reviewed the patient's labs and imaging.    ASSESSMENT AND PLAN:      Active Problems:    * No active hospital problems. *       Procedure(s):  COLONOSCOPY      I discussed the patient's findings and my recommendations with the patient.    Nikolay Leahy MD  10/25/22  08:34 EDT              "

## 2022-10-25 NOTE — ANESTHESIA POSTPROCEDURE EVALUATION
Patient: Ashkan Farley    Procedure Summary     Date: 10/25/22 Room / Location: [facility] ENDOSCOPY 1 / [facility] ENDOSCOPY    Anesthesia Start: 0836 Anesthesia Stop: 0859    Procedure: COLONOSCOPY WITH POLYPECTOMY X2 Diagnosis:       Colon cancer screening      (Colon cancer screening [Z12.11])    Surgeons: Nikolay Leahy MD Provider: Dedrick Toussaint MD    Anesthesia Type: MAC ASA Status: 3          Anesthesia Type: MAC    Vitals  Vitals Value Taken Time   /74 10/25/22 0905   Temp     Pulse 53 10/25/22 0910   Resp 12 10/25/22 0910   SpO2 99 % 10/25/22 0910           Post Anesthesia Care and Evaluation    Patient location during evaluation: PACU  Patient participation: complete - patient participated  Level of consciousness: awake  Pain scale: See nurse's notes for pain score.  Pain management: adequate    Airway patency: patent  Anesthetic complications: No anesthetic complications  PONV Status: none  Cardiovascular status: acceptable  Respiratory status: acceptable  Hydration status: acceptable    Comments: Patient seen and examined postoperatively; vital signs stable; SpO2 greater than or equal to 90%; cardiopulmonary status stable; nausea/vomiting adequately controlled; pain adequately controlled; no apparent anesthesia complications; patient discharged from anesthesia care when discharge criteria were met

## 2022-10-26 LAB
LAB AP CASE REPORT: NORMAL
PATH REPORT.FINAL DX SPEC: NORMAL
PATH REPORT.GROSS SPEC: NORMAL

## 2023-01-23 ENCOUNTER — OFFICE VISIT (OUTPATIENT)
Dept: CARDIOLOGY | Facility: CLINIC | Age: 48
End: 2023-01-23
Payer: COMMERCIAL

## 2023-01-23 VITALS
WEIGHT: 236 LBS | OXYGEN SATURATION: 98 % | BODY MASS INDEX: 31.28 KG/M2 | HEART RATE: 66 BPM | HEIGHT: 73 IN | DIASTOLIC BLOOD PRESSURE: 82 MMHG | SYSTOLIC BLOOD PRESSURE: 119 MMHG

## 2023-01-23 DIAGNOSIS — I48.0 PAROXYSMAL ATRIAL FIBRILLATION: Primary | ICD-10-CM

## 2023-01-23 DIAGNOSIS — R00.2 PALPITATIONS: ICD-10-CM

## 2023-01-23 DIAGNOSIS — E78.00 PURE HYPERCHOLESTEROLEMIA: ICD-10-CM

## 2023-01-23 PROCEDURE — 99213 OFFICE O/P EST LOW 20 MIN: CPT | Performed by: INTERNAL MEDICINE

## 2023-01-23 PROCEDURE — 93000 ELECTROCARDIOGRAM COMPLETE: CPT | Performed by: INTERNAL MEDICINE

## 2023-01-23 NOTE — PROGRESS NOTES
CC--- paroxysmal atrial fibrillation     Sub--47-year-old male patient with atrial fibrillation and hyperlipidemia comes in for follow-up.  He denies any symptoms of atrial fibrillation last 1 year.  Patient is very compliant with his lipid medications.  Patient is in functional class I and denies any excessive alcohol usage and no sleep apnea symptoms.  He had atrial fibrillation few years ago.  Prior testing included stress test echo and a cardiac catheterization which showed normal EF without any significant coronary artery stenosis.  Patient does have history of acid reflux and aspirin has been stopped in the past.          Past Medical History:   Diagnosis Date   • Atrial fibrillation (CMS/HCC)    • Depression      Past Surgical History:   Procedure Laterality Date   • CARDIAC CATHETERIZATION N/A 3/5/2021    Procedure: LEFT HEART CATH with possible PCI;  Surgeon: Rick Severino DO;  Location: [facility] CATH INVASIVE LOCATION;  Service: Cardiology;  Laterality: N/A;  Local and IV sedation   • CARDIAC CATHETERIZATION N/A 3/5/2021    Procedure: Coronary angiography;  Surgeon: Rick Severino DO;  Location: [facility] CATH INVASIVE LOCATION;  Service: Cardiology;  Laterality: N/A;   • CARDIAC CATHETERIZATION N/A 3/5/2021    Procedure: Left ventriculography;  Surgeon: Rick Severino DO;  Location:  [facility] CATH INVASIVE LOCATION;  Service: Cardiology;  Laterality: N/A;   • SHOULDER SURGERY       Family History   Problem Relation Age of Onset   • Heart disease Father    • Atrial fibrillation Father        Physical Exam    General:      well developed, well nourished, in no acute distress.    Head:      normocephalic and atraumatic.    Eyes:      PERRL/EOM intact, conjunctivae and sclerae clear without nystagmus.    Neck:      no  thyromegaly, trachea central with normal respiratory effort  Lungs:      clear bilaterally to auscultation.    Heart:       regular rate and rhythm, S1, S2  without murmurs, rubs, or gallops  Skin:      intact without lesions or rashes.    Psych:      alert and cooperative; normal mood and affect; normal attention span and concentration.        A/P    Paroxysmal atrial fibrillation without recurrence  [id number] score of 0  Hyperlipidemia well treated and modified and statins prescribed electronically  Follow-up in 1 year  Continue smart watch surveillance  Recent labs reviewed---LDL 70, TSH is normal        ECG 12 Lead    Date/Time: 1/23/2023 8:57 AM  Performed by: Naren Darby MD  Authorized by: Naren Darby MD   Comparison: compared with previous ECG   Similar to previous ECG  Rhythm: sinus rhythm  Rate: normal  Conduction: conduction normal            Electronically signed by Naren Darby MD, 01/23/23, 8:57 AM EST.

## 2023-03-07 RX ORDER — ATORVASTATIN CALCIUM 40 MG/1
TABLET, FILM COATED ORAL
Qty: 90 TABLET | Refills: 2 | Status: SHIPPED | OUTPATIENT
Start: 2023-03-07

## 2023-05-02 RX ORDER — SERTRALINE HYDROCHLORIDE 100 MG/1
TABLET, FILM COATED ORAL
Qty: 30 TABLET | Refills: 1 | Status: SHIPPED | OUTPATIENT
Start: 2023-05-02

## 2023-05-09 ENCOUNTER — TELEPHONE (OUTPATIENT)
Dept: FAMILY MEDICINE CLINIC | Facility: CLINIC | Age: 48
End: 2023-05-09
Payer: COMMERCIAL

## 2023-05-09 RX ORDER — SERTRALINE HYDROCHLORIDE 100 MG/1
100 TABLET, FILM COATED ORAL DAILY
Qty: 90 TABLET | Refills: 2 | Status: SHIPPED | OUTPATIENT
Start: 2023-05-09

## 2023-05-09 NOTE — TELEPHONE ENCOUNTER
Caller: Ashkan Farley A    Relationship to patient: Self    Best call back number: [phone]    Patient is needing: PATIENT CALLED AND SAID THAT HE WENT TO  THE LAST REFILL OF HIS SERTRALINE AND HE SAID THAT HE   WAS TOLD THEN THAT HE NEEDS TO HAVE DR. MANRIQUE WRITE A NEW PRESCRIPTION FOR THE SERTRALINE WITH REFILLS SO IT CAN RENEW MONTHLY     PATIENT WAS NOT AWARE DR. MANRIQUE HAD REFILLED IT ON 05/02/23 AND SAID HE WOULD CHECK WITH SUSAN ABOUT THAT

## 2023-08-16 RX ORDER — SERTRALINE HYDROCHLORIDE 100 MG/1
100 TABLET, FILM COATED ORAL DAILY
Qty: 90 TABLET | Refills: 2 | Status: SHIPPED | OUTPATIENT
Start: 2023-08-16

## 2023-12-04 RX ORDER — ATORVASTATIN CALCIUM 40 MG/1
TABLET, FILM COATED ORAL
Qty: 90 TABLET | Refills: 3 | Status: SHIPPED | OUTPATIENT
Start: 2023-12-04

## 2024-01-26 ENCOUNTER — OFFICE VISIT (OUTPATIENT)
Dept: CARDIOLOGY | Facility: CLINIC | Age: 49
End: 2024-01-26
Payer: COMMERCIAL

## 2024-01-26 VITALS
HEART RATE: 65 BPM | SYSTOLIC BLOOD PRESSURE: 119 MMHG | BODY MASS INDEX: 32.48 KG/M2 | DIASTOLIC BLOOD PRESSURE: 77 MMHG | WEIGHT: 232 LBS | OXYGEN SATURATION: 99 % | HEIGHT: 71 IN

## 2024-01-26 DIAGNOSIS — E78.00 PURE HYPERCHOLESTEROLEMIA: ICD-10-CM

## 2024-01-26 DIAGNOSIS — I48.0 PAROXYSMAL ATRIAL FIBRILLATION: Primary | ICD-10-CM

## 2024-01-26 DIAGNOSIS — R00.2 PALPITATIONS: ICD-10-CM

## 2024-01-26 PROCEDURE — 93000 ELECTROCARDIOGRAM COMPLETE: CPT | Performed by: INTERNAL MEDICINE

## 2024-01-26 PROCEDURE — 99213 OFFICE O/P EST LOW 20 MIN: CPT | Performed by: INTERNAL MEDICINE

## 2024-01-26 NOTE — LETTER
January 26, 2024       No Recipients    Patient: Ashkan Farley   YOB: 1975   Date of Visit: 1/26/2024     Dear Brando Piper MD:       Thank you for referring Ashkan Farley to me for evaluation. Below are the relevant portions of my assessment and plan of care.    If you have questions, please do not hesitate to call me. I look forward to following Ashkan along with you.         Sincerely,        Naren Darby MD        CC:   No Recipients    Naren Darby MD  01/26/24 0824  Sign when Signing Visit  CC--- paroxysmal atrial fibrillation     Sub--48-year-old male patient has paroxysmal atrial fibrillation hyperlipidemia and comes in for yearly follow-up.  Patient is in functional class I and prior stress test echocardiography and cardiac catheterization showed normal EF without significant coronary artery stenosis.  He has history of acid reflux in the past.            Past Medical History:   Diagnosis Date   • Atrial fibrillation (CMS/HCC)    • Depression      Past Surgical History:   Procedure Laterality Date   • CARDIAC CATHETERIZATION N/A 3/5/2021    Procedure: LEFT HEART CATH with possible PCI;  Surgeon: Rick Severino DO;  Location: [facility] CATH INVASIVE LOCATION;  Service: Cardiology;  Laterality: N/A;  Local and IV sedation   • CARDIAC CATHETERIZATION N/A 3/5/2021    Procedure: Coronary angiography;  Surgeon: Rick Severino DO;  Location: [facility] CATH INVASIVE LOCATION;  Service: Cardiology;  Laterality: N/A;   • CARDIAC CATHETERIZATION N/A 3/5/2021    Procedure: Left ventriculography;  Surgeon: Rick Severino DO;  Location: [facility] CATH INVASIVE LOCATION;  Service: Cardiology;  Laterality: N/A;   • SHOULDER SURGERY       Family History   Problem Relation Age of Onset   • Heart disease Father    • Atrial fibrillation Father        Physical Exam    General:      well developed, well nourished, in no acute distress.    Head:      normocephalic and  atraumatic.    Eyes:      PERRL/EOM intact, conjunctivae and sclerae clear without nystagmus.    Neck:      no  thyromegaly, trachea central with normal respiratory effort  Lungs:      clear bilaterally to auscultation.    Heart:       regular rate and rhythm, S1, S2 without murmurs, rubs, or gallops  Skin:      intact without lesions or rashes.    Psych:      alert and cooperative; normal mood and affect; normal attention span and concentration.          A/P    History of paroxysmal atrial fibrillation with a recurrence  Hyperlipidemia on statins currently being treated with atorvastatin  Rare PVC noted on EKG  Prior cardiac workup without ischemia with normal EF  Medications reviewed and follow-up in 1 year        ECG 12 Lead    Date/Time: 1/26/2024 8:22 AM  Performed by: Naren Darby MD    Authorized by: Naren Darby MD  Comparison: compared with previous ECG   Similar to previous ECG  Rhythm: sinus rhythm  Ectopy: unifocal PVCs  Rate: normal  Conduction: conduction normal       Electronically signed by Naren Darby MD, 01/26/24, 8:21 AM EST.

## 2024-01-26 NOTE — PROGRESS NOTES
CC--- paroxysmal atrial fibrillation     Sub--48-year-old male patient has paroxysmal atrial fibrillation hyperlipidemia and comes in for yearly follow-up.  Patient is in functional class I and prior stress test echocardiography and cardiac catheterization showed normal EF without significant coronary artery stenosis.  He has history of acid reflux in the past.            Past Medical History:   Diagnosis Date    Atrial fibrillation (CMS/HCC)     Depression      Past Surgical History:   Procedure Laterality Date    CARDIAC CATHETERIZATION N/A 3/5/2021    Procedure: LEFT HEART CATH with possible PCI;  Surgeon: Rick Severino DO;  Location: [facility] CATH INVASIVE LOCATION;  Service: Cardiology;  Laterality: N/A;  Local and IV sedation    CARDIAC CATHETERIZATION N/A 3/5/2021    Procedure: Coronary angiography;  Surgeon: Rick Severino DO;  Location: [facility] CATH INVASIVE LOCATION;  Service: Cardiology;  Laterality: N/A;    CARDIAC CATHETERIZATION N/A 3/5/2021    Procedure: Left ventriculography;  Surgeon: Rick Severino DO;  Location: [facility] CATH INVASIVE LOCATION;  Service: Cardiology;  Laterality: N/A;    SHOULDER SURGERY       Family History   Problem Relation Age of Onset    Heart disease Father     Atrial fibrillation Father        Physical Exam    General:      well developed, well nourished, in no acute distress.    Head:      normocephalic and atraumatic.    Eyes:      PERRL/EOM intact, conjunctivae and sclerae clear without nystagmus.    Neck:      no  thyromegaly, trachea central with normal respiratory effort  Lungs:      clear bilaterally to auscultation.    Heart:       regular rate and rhythm, S1, S2 without murmurs, rubs, or gallops  Skin:      intact without lesions or rashes.    Psych:      alert and cooperative; normal mood and affect; normal attention span and concentration.          A/P    History of paroxysmal atrial fibrillation with a  recurrence  Hyperlipidemia on statins currently being treated with atorvastatin  Rare PVC noted on EKG  Prior cardiac workup without ischemia with normal EF  Medications reviewed and follow-up in 1 year  Asx --Pvc's       ECG 12 Lead    Date/Time: 1/26/2024 8:22 AM  Performed by: Naren Darby MD    Authorized by: Naren Darby MD  Comparison: compared with previous ECG   Similar to previous ECG  Rhythm: sinus rhythm  Ectopy: unifocal PVCs  Rate: normal  Conduction: conduction normal       Electronically signed by Naren Darby MD, 01/26/24, 8:21 AM EST.

## 2024-01-29 ENCOUNTER — E-VISIT (OUTPATIENT)
Dept: FAMILY MEDICINE CLINIC | Facility: TELEHEALTH | Age: 49
End: 2024-01-29

## 2024-01-29 PROCEDURE — BRIGHTMDVISIT: Performed by: NURSE PRACTITIONER

## 2024-01-29 NOTE — E-VISIT TREATED
Chief Complaint: Low back pain   Patient introduction   Patient is 48-year-old male with pain on or along the spine. Back pain is not work-related. Not receiving disability compensation related to their back pain. No lawsuit regarding their back pain.   General presentation   Low back pain for less than 1 week. Patient has moderate pain that makes everyday activities uncomfortable.   Pain described as sharp, shooting, stabbing, or burning. Pain is not worse at night or when supine. Patient suspects current symptoms were caused by heavy lifting, bending over, and an awkward position. Alleviating factors include resting/lying down and changing positions. Aggravating factors include sitting for an extended period, standing for an extended period, physical activity/exercise, bending over, straightening up, twisting to the side, leaning to the side, and coughing/sneezing.   Prior back pain was similar to current symptoms. Last episode of low back pain was more than 6 months ago.   No history of spinal/back procedures in the last year.   Urinary symptoms    No burning with urination    No frequent urination    No blood in urine   Treatments tried for current symptoms:   The following treatments were helpful for current symptoms:    Heat    Ice   The following treatments were not helpful for current symptoms:    Acetaminophen    Aspirin (325 mg qd)    Ibuprofen (400 mg bid)   Review of red flags/alarm symptoms:    No spinal pain along with long-term oral steroid use, an associated bruise or scrape, or a history of osteoporosis    Back pain has been present for less than 6 weeks    No back pain due to significant trauma    No urinary incontinence, urinary retention, or fecal incontinence    No history of ED symptoms    No saddle anesthesia    No history of osteoporosis    No pelvic or lower abdominal pain    No nausea or vomiting    No pain that is worse after eating    No difficulty walking or staying upright as a  result of back pain    No bacteremia, sepsis, or endocarditis    No hemodialysis within the last 2 months    No injection drug use in the last 6 months    No bilateral leg weakness    No fever, severe fatigue, unintentional weight loss, or drenching night sweats    No history of cancer   Self-exam:    No radicular pain.    Patient can squat down and rise up to a standing position without difficulty (L4 motor screening).    Patient can walk on heels for 10 steps without difficulty (L5 motor screening).    Patient can walk on toes for 10 steps without difficulty (S1 motor screening).   Prognosis and risk stratification:   Willing to try physical therapy.   Current medications   Currently taking atorvastatin 40 MG tablet, sertraline 100 MG tablet, and cholecalciferol 25 MCG (1000 UT) tablet.   Medication allergies   No relevant drug allergies.   Medication contraindication review   No history of heart block/conduction disorders/arrhythmias, ASA- or NSAID-induced asthma or urticaria, aspirin-exacerbated respiratory disease (AERD), congestive heart failure, hyperthyroidism, recent CABG surgery, or recent myocardial infarction.   Past medical history   Immune conditions: No immunocompromising conditions.   No history of cancer.   Patient-submitted comments   Patient was asked if they had anything to add about their symptoms. Patient writes: This has happened to me before and physical therapy has helped. I was hoping to get approval for physical therapy..   Patient did not request an excuse note.   Assessment   Low back pain.   This is the likely diagnosis based on patient's interview responses, including the absence of radicular pain.   Plan   Medications:   No medications prescribed.   Orders:    Referral to physical therapy.   Education:    Condition and causes    Prevention    Treatment and self-care    When to call provider   ----------   Electronically signed by SERENA Whalen on 2024-01-29 at 09:52AM    ----------   Patient Interview Transcript:   Where on your lower back do you feel pain? This interview treats low back pain only. Select one.    Along the spine or bony part of my back   Not selected:    Right side    Left side    Both sides   Since your back pain started, have you had any of these stomach- or bladder-related symptoms? Select all that apply.    None of these   Not selected:    Abdominal pain or discomfort    Nausea    Vomiting    Pain that's worse after eating    Pelvic or lower abdominal pain    Burning with urination    Frequent urination    Blood in your urine   Do any of these statements apply to you? Select all that apply.    None of the above   Not selected:    I've been taking oral steroids such as prednisone for a long time    I have a bruise or scrape on my spine where the pain is    I have osteoporosis   How long have you had your current episode of back pain? Select one.    Less than 1 week   Not selected:    1 to 2 weeks    3 to 4 weeks    5 to 6 weeks    More than 6 weeks   What does the pain feel like? Select one.    Sharp, shooting, stabbing, or burning   Not selected:    Dull or aching    Other (please describe)   How severe is your back pain? Think about how the pain affects your everyday activities. On a scale of 1 to 10, for example, how difficult is it to get out of bed, get dressed, shower, or go to work or school? Select one.    Moderate, 4 to 6; my pain is strong, and it makes everyday activities uncomfortable   Not selected:    Mild, 1 to 3; my pain is noticeable and distracting, but I am still able to do everyday activities    Severe, 7 to 9; my pain is distressing, and it limits me from doing some everyday activities    Unbearable, 10+; my pain is so intense that it keeps me from doing almost all everyday activities   Does the pain travel down from your lower back to your buttock, thigh, lower leg, or foot? Select one.    No   Not selected:    Yes, it travels down my right  side    Yes, it travels down my left side    Yes, it travels down both sides   Since your back pain started, have you had numbness or loss of feeling in the pattern as shown?    No   Not selected:    Yes    I have an underlying condition that causes chronic numbness or loss of feeling in that area   Does your back pain get worse at night or when you're lying down? Select one.    No   Not selected:    Yes   Do any of these make your back pain worse? Select all that apply.    Sitting in one position for a long time    Being on my feet for a long time    Physical activity or exercise    Bending over    Standing up from a bent over position    Twisting to the side    Leaning to the side    Coughing or sneezing   Not selected:    None of the above   Do any of these help your back feel better? Select all that apply.    Resting or lying down    Frequently changing positions   Not selected:    Physical activity or exercise    Stretching    None of the above   Since your back pain started, have you stumbled or fallen because of problems with balance or coordination? Select one.    No   Not selected:    Yes    I have an underlying condition that prevents me from standing or walking    I have an underlying condition that causes problems with my balance or coordination   Along with your back pain, have you noticed a loss of strength in your legs? Select one.    No   Not selected:    Yes, but only in one leg    Yes, in both legs    I have limited or no strength in my legs because of a chronic underlying condition   Since your back pain started, have you had any of these symptoms? Back pain doesn't usually come with other symptoms that affect your whole body. Select all that apply.    None of these   Not selected:    Unexplained fever    Severe fatigue that doesn't improve with rest    Unintentional weight loss    Drenching night sweats   Since your back pain began, have you noticed any loss of bowel or bladder function? This  includes urinating or having a bowel movement when you didn't mean to. It also includes feeling like you can't urinate or empty your bladder all the way. Select one.    No   Not selected:    Yes   Since your back pain started, has it been difficult to get or keep an erection? Select one.    No   Not selected:    Yes, this is new for me    Yes, I've had this before but it's gotten worse since my back pain started    Yes, I've had this before and it hasn't gotten worse since my back pain started   Can you squat down and then rise to a standing position?    Yes, with ease   Not selected:    Yes, but my right leg seems weaker than my left leg    Yes, but my left leg seems weaker than my right leg    No, my right leg is too weak    No, my left leg is too weak    No, both my legs are too weak   Can you walk on your heels for at least 10 steps?    Yes, with ease   Not selected:    Yes, but it's hard to keep my right toes up    Yes, but it's hard to keep my left toes up    No, because I can't keep my right toes up high enough    No, because I can't keep my left toes up high enough    No, because I can't keep my toes up on both the right and left sides   Can you walk on your toes for at least 10 steps?    Yes, with ease   Not selected:    Yes, but it's harder to keep my right heel up    Yes, but it's harder to keep my left heel up    No, because I can't keep my right heel up high enough    No, because I can't keep my left heel up high enough    No, because I can't keep my heels up on both my right and left sides   The next few questions ask you about what may have caused your back pain. Was your back pain triggered by any of these activities? Select all that apply.    Heavy lifting    Bending over    An awkward position   Not selected:    A new sport or activity    Intense exercise    A twisting motion    Another activity (specify)    No, not that I'm aware of   Is your back pain the result of a serious injury, fall, or  "accident? A serious injury or fall might include falling off a ladder more than 10 feet high or being involved in a car accident more serious than a \"fender jack.\" Select one.    No   Not selected:    Yes   Is your back pain work-related? This includes injuries suffered at work and symptoms caused by repetitive activities at work (such as overuse injuries). Select one.    No   Not selected:    Yes   Within the last year, have you had any medical procedures done on your spine or back? Examples include back or spine surgeries, spinal injections, epidural injections, and epidural catheter placement. Select one.    No   Not selected:    Yes   Have you recently been treated for bacteremia, sepsis, or endocarditis? Bacteremia is a condition where bacteria is found in the blood. Sepsis can be a serious, life-threatening complication of bacteremia. Endocarditis is an infection of the lining of the heart and heart valves. Select one.    No   Not selected:    Yes   Have you had hemodialysis within the last 2 months? Hemodialysis (also known as dialysis) is a treatment for kidney failure. Select one.    No   Not selected:    Yes   In the last 6 months, have you used any injection drugs, such as heroin, cocaine, crystal meth, amphetamines, or opiates? Using injection drugs can put you at risk for serious infections of the spine and surrounding tissues. Your response to this question will only be shared with your healthcare provider. Select one.    No   Not selected:    Yes   Have you had low back pain before? Select one.    Yes, and it felt similar   Not selected:    Yes, but it felt different    No   When was the last time you had low back pain? Select one.    More than 6 months ago   Not selected:    Within the last month    1 to 3 months ago    4 to 6 months ago   Are you currently receiving any disability compensation related to your back pain? If so, you may be instructed to follow up with your treating physician. Select " one.    No   Not selected:    Yes   Are you currently involved in a lawsuit associated with your back pain? Have you filed any legal action regarding your back pain? If so, you may be instructed to follow up with your treating physician. Select one.    No   Not selected:    Yes   Do you have any of these conditions that can affect the immune system? Scroll to see all options. Select all that apply.    None of these   Not selected:    History of bone marrow transplant    Chronic kidney disease    Chronic liver disease (including cirrhosis)    HIV/AIDS    Inflammatory bowel disease (Crohn's disease or ulcerative colitis)    Lupus    Moderate to severe plaque psoriasis    Multiple sclerosis    Rheumatoid arthritis    Sickle cell anemia    Alpha or beta thalassemia    History of kidney, liver, heart, or other solid organ transplant    History of liver, heart, or other solid organ transplant    History of spleen removal    An autoimmune disorder not listed here (specify)    A condition requiring treatment with long-term use of oral steroids (such as prednisone, prednisolone, or dexamethasone) (specify)   Have you ever been diagnosed with cancer? Select one.    No   Not selected:    Yes, I have cancer now    Yes, but I'm in remission   Have you had gastric bypass surgery? Select one.    No   Not selected:    Yes   Have you tried any treatments for your low back pain? Select one.    Yes   Not selected:    No   Acetaminophen (Tylenol)    Not helpful   Not selected:    Helpful   Aspirin    Not helpful   Not selected:    Helpful   Heat    Helpful   Not selected:    Not helpful   Ibuprofen (Advil, Motrin)    Not helpful   Not selected:    Helpful   Ice    Helpful   Not selected:    Not helpful   What dose of aspirin are you taking? The dose is the total number of milligrams you take each time. For example, if you take two 325 mg pills at a time, your dose is 650 mg. Select one.    325 mg   Not selected:    81 mg    162 mg     500 mg    650 mg    1000 mg    Other (please specify)   How often are you taking aspirin? Select one.    1 time a day   Not selected:    2 times a day    3 times a day    4 times a day    More than 4 times a day   What dose of ibuprofen (Advil, Motrin) are you taking? Select one.    400 mg   Not selected:    100 mg    200 mg    600 mg    800 mg    Other (please specify)   How often are you taking ibuprofen (Advil, Motrin)? Select one.    2 times a day   Not selected:    1 time a day    3 times a day    4 times a day    More than 4 times a day   If recommended by your provider, would you be willing to try physical therapy? Physical therapy may include hands-on therapy, strength and flexibility exercises, and posture training. We'll keep in mind your preferences when creating a treatment plan. Select all that apply.    Yes   Not selected:    Maybe, depending on the cost    Maybe, depending on the time commitment    No   Let's make sure the medications in your treatment plan are safe for you to take. Are you being treated for any of these conditions? Select all that apply.    None of the above   Not selected:    Arrhythmias, heart block, or conduction disorders    Aspirin- or NSAID-induced asthma or urticaria    Aspirin-exacerbated respiratory disease (AERD)    Congestive heart failure    Hyperthyroidism    Recent coronary artery bypass graft (CABG) surgery    Recent heart attack   Have you taken any monoamine oxidase inhibitor (MAOI) medications in the last 14 days? Examples include rasagiline (Azilect), selegiline (Eldepryl, Zelapar), isocarboxazid (Marplan), phenelzine (Nardil), and tranylcypromine (Parnate). Select one.    No   Not selected:    Yes   Are you currently taking either of these medications? Select all that apply.    Neither of these   Not selected:    Ciprofloxacin (Cipro)    Fluvoxamine (Luvox)   Are you still taking these medications listed in your medical record? If you're not taking any of these,  click Next. Select all that apply.    atorvastatin 40 MG tablet    sertraline 100 MG tablet    cholecalciferol 25 MCG (1000 UT) tablet   Are you taking any other medications, vitamins, or supplements? Select one.    No   Not selected:    Yes   Have you ever had an allergic or bad reaction to any medication? Select one.    Yes   Not selected:    No   Have you ever had an allergic or bad reaction to any of these medications? Select all that apply.    No   Not selected:    Acetaminophen (Tylenol)    Cyclobenzaprine hydrochloride (Flexeril)    Diclofenac (Voltaren)    Ibuprofen (Advil, Motrin, Midol)    Meloxicam (Mobic)    Methocarbamol (Robaxin)    Naproxen (Aleve)    Tizanidine (Zanaflex)   Do you need a doctor's note? A doctor's note confirms that you received care today and states when you can return to school or work. It does not contain information about your diagnosis or treatment plan. Your provider will make the final decision on whether to give you a doctor's note. Doctor's notes CANNOT be backdated. Select one.    No   Not selected:    Today only (1 day)    Today and tomorrow (2 days)    3 days   Is there anything you'd like to add about your symptoms? Please limit your comments to the symptoms asked about in this interview. If you include comments about other concerns, your provider may recommend that you be seen in person.    This has happened to me before and physical therapy has helped. I was hoping to get approval for physical therapy.   ----------   Medical history   Medical history data does not currently exist for this patient.

## 2024-01-29 NOTE — EXTERNAL PATIENT INSTRUCTIONS
"   Note   Hi Mr. Farley, I have ordered PT for you today. I would recommend following up with your PCP for an in person exam for the back pain. This could shed more light on your problem and your PCP may have more medication options for you. best regards, Aurora LYONS   Diagnosis   Low back pain   My name is Aurora Campos APRRAD, and I'm a healthcare provider at [facility].   Over 80% of people have low back pain at some point in their lives. Low back pain is rarely serious, and most cases get better on their own, usually within 4 to 6 weeks.   In the Other treatment section below, I've provided some tips to help you feel better.   Orders and referrals   Based on your responses, I'm recommending that you see a physical therapist. You should receive a call from a [facility] staff member in the next few days to schedule an appointment. If you don't hear from us within 3 days, contact us to check on your referral to physical therapy.   A physical therapist can provide more treatment options to help with your back pain. These options will be designed specifically for you, and may include hands-on therapy, strength and flexibility exercises, and posture training.   About your diagnosis   Low back pain can have many causes. Most of the time, however, people have \"nonspecific low back pain,\" meaning that there isn't a clear disease, abnormality, or injury causing the pain. More serious cases of low back pain involve damaged discs, inflammation or swelling of the spinal joints, misaligned or fractured vertebrae, and, in rare instances, tumors or infections.   Some factors that increase your risk of developing low back pain include:    Older age    Obesity or weight gain: Extra weight puts more stress on a person's back    Low fitness level: When you're less physically fit, you may develop back pain because your back and abdominal muscles don't properly support your spine    A job that requires heavy lifting or " twisting    A physically inactive job, such as sitting at a desk all day   Most people with back pain do not need X-rays, MRIs, or surgery.   What to expect   If you follow the treatments recommended here, you should feel better within a few days. Your low back pain should go away completely within 6 weeks.   Many people will develop low back pain again within 6 to 12 months of the first event. To prevent low back pain in the future, follow the advice in the Prevention section below.   When to seek care   Call us at [phone]   with any sudden or unexpected symptoms.    Your back pain makes doing simple tasks very difficult or impossible.    Your back pain doesn't improve within 6 weeks.    Numbness or weakness in your legs.    Problems controlling your bladder or bowels.    Unexplained weight loss.   Other treatment   One of the best things you can do for your low back pain is keep moving! Studies show that people recover faster from low back pain when they remain active. Walking, light stretching, and regular day-to-day activities all help relieve muscle spasms and prevent loss of muscle strength. Aerobic exercise like swimming, biking, and walking has also been shown to help decrease pain and improve overall physical functioning.   The following treatments and alternative therapies may also be helpful:    Heat: Heating pads can ease your back pain by helping muscles relax.    Yoga: Yoga involves holding and moving between postures with controlled breathing to build strength and flexibility. You may find that yoga not only helps with your back pain but also gives you a greater sense of health and well-being.    Pilates: Pilates also involves controlled movements but focuses on the core muscles found in the abdomen and back. Benefits include better body alignment, core strength, breathing, and balance.    Community-based exercise programs: Group classes combining education and physical training may improve  your pain and physical functioning.    Acupuncture, biofeedback, massage, and spinal manipulation: These therapies teach proper body alignment and relaxation techniques. They may provide pain relief and a general sense of well-being. If you're interested in a referral to one of these therapies, get in touch with your primary care provider for more information.   Prevention   What can you do to prevent back pain?    Keep moving and stay active. Exercises that strengthen and stretch the core muscles, including the back, are best. Try this exercise program from the [employer] Academy [employer]: http://orthoinfo.aaos.org/topic.cfm?ginmn=a08791  . It's designed to promote flexibility and strength in the lower back.    Avoid slouching and don't sit or  the same position for too long. Make sure your work surfaces are at a comfortable height.    When bending and lifting, use your legs instead of your back. Bend and lift without twisting. Never lift objects that are too heavy.    Always wear comfortable, low-heeled shoes.    Eat whole, nutrient-dense foods and avoid weight gain. Excess weight around the waistline can strain lower back muscles.    Avoid smoking. Smoking increases the risk of spinal disc degeneration and osteoporosis. Coughing due to heavy smoking may also lead to strained muscles and back pain.   Your provider   Your diagnosis was provided by SERENA Whalen, a member of your trusted care team at [facility].   If you have any questions, call us at [phone]  .

## 2024-03-05 RX ORDER — SERTRALINE HYDROCHLORIDE 100 MG/1
100 TABLET, FILM COATED ORAL DAILY
Qty: 90 TABLET | Refills: 1 | Status: SHIPPED | OUTPATIENT
Start: 2024-03-05

## 2024-09-02 RX ORDER — SERTRALINE HYDROCHLORIDE 100 MG/1
100 TABLET, FILM COATED ORAL DAILY
Qty: 90 TABLET | Refills: 3 | OUTPATIENT
Start: 2024-09-02

## 2024-10-03 RX ORDER — SERTRALINE HYDROCHLORIDE 100 MG/1
100 TABLET, FILM COATED ORAL DAILY
Qty: 90 TABLET | Refills: 1 | Status: SHIPPED | OUTPATIENT
Start: 2024-10-03

## 2024-10-03 NOTE — TELEPHONE ENCOUNTER
Caller: Ashkan Farley    Relationship: Self    Best call back number: [phone]    Requested Prescriptions:   Requested Prescriptions     Pending Prescriptions Disp Refills    sertraline (ZOLOFT) 100 MG tablet 90 tablet 1     Sig: Take 1 tablet by mouth Daily.        Pharmacy where request should be sent: [facility] [id number] [facility] [place] [address] - [phone]  - [phone]     Last office visit with prescribing clinician: Visit date not found   Last telemedicine visit with prescribing clinician: Visit date not found   Next office visit with prescribing clinician: Visit date not found     Additional details provided by patient: PATIENT LEAVING FOR VACATION TOMORROW     Does the patient have less than a 3 day supply:  [] Yes  [x] NO    Would you like a call back once the refill request has been completed: [x] Yes [] No    If the office needs to give you a call back, can they leave a voicemail: [x] Yes [] No        Sissy Alexander Rep   10/03/24 09:26 EDT

## 2024-10-30 ENCOUNTER — TELEPHONE (OUTPATIENT)
Dept: FAMILY MEDICINE CLINIC | Facility: CLINIC | Age: 49
End: 2024-10-30
Payer: COMMERCIAL

## 2024-10-30 NOTE — TELEPHONE ENCOUNTER
Gave message to patient at 3:03pm. He said he is a personal friend of Dr Piper's so he will speak with Dr Piper and get the best plan forward figured out and call us back.

## 2024-10-30 NOTE — TELEPHONE ENCOUNTER
Caller: Ashkan Farley    Relationship to patient: Self    Best call back number: [phone]     Type of visit: PHYSICAL    Additional notes: PATIENT IS NEEDING A PHYSICAL COMPLETED AND PCP IS SCHEDULED TIL JULY. PLEASE CALL AND SCHEDULE

## 2024-10-30 NOTE — TELEPHONE ENCOUNTER
Caller: Ashkan Farley    Relationship: Self    Best call back number: [phone]     What orders are you requesting (i.e. lab or imaging): LABS    Where will you receive your lab/imaging services: OFFICE    Additional notes: PATIENT IS NEEDING ROUTINE BLOOD WORK. PLEASE CALL AND SCHEDULE WHEN ORDERS HAVE BEEN ENTERED.

## 2024-10-30 NOTE — TELEPHONE ENCOUNTER
Would recommend patient look at establishing with Dr. Barkley or Dr. Farah, no current physical openings.

## 2024-11-19 ENCOUNTER — OFFICE VISIT (OUTPATIENT)
Dept: FAMILY MEDICINE CLINIC | Facility: CLINIC | Age: 49
End: 2024-11-19
Payer: COMMERCIAL

## 2024-11-19 ENCOUNTER — LAB (OUTPATIENT)
Dept: FAMILY MEDICINE CLINIC | Facility: CLINIC | Age: 49
End: 2024-11-19
Payer: COMMERCIAL

## 2024-11-19 VITALS
OXYGEN SATURATION: 98 % | WEIGHT: 235.4 LBS | DIASTOLIC BLOOD PRESSURE: 84 MMHG | RESPIRATION RATE: 18 BRPM | BODY MASS INDEX: 32.96 KG/M2 | HEIGHT: 71 IN | HEART RATE: 60 BPM | SYSTOLIC BLOOD PRESSURE: 136 MMHG

## 2024-11-19 DIAGNOSIS — E78.5 HYPERLIPIDEMIA LDL GOAL <70: ICD-10-CM

## 2024-11-19 DIAGNOSIS — R03.0 ELEVATED BLOOD PRESSURE READING: ICD-10-CM

## 2024-11-19 DIAGNOSIS — Z11.59 NEED FOR HEPATITIS C SCREENING TEST: ICD-10-CM

## 2024-11-19 DIAGNOSIS — Z86.79 HISTORY OF ATRIAL FIBRILLATION: ICD-10-CM

## 2024-11-19 DIAGNOSIS — Z12.5 SCREENING FOR PROSTATE CANCER: ICD-10-CM

## 2024-11-19 DIAGNOSIS — Z00.00 PREVENTATIVE HEALTH CARE: Primary | ICD-10-CM

## 2024-11-19 DIAGNOSIS — E78.2 MIXED HYPERLIPIDEMIA: ICD-10-CM

## 2024-11-19 DIAGNOSIS — F34.1 PERSISTENT DEPRESSIVE DISORDER: Chronic | ICD-10-CM

## 2024-11-19 DIAGNOSIS — K29.00 OTHER ACUTE GASTRITIS WITHOUT HEMORRHAGE: ICD-10-CM

## 2024-11-19 PROBLEM — R94.39 ABNORMAL NUCLEAR STRESS TEST: Status: RESOLVED | Noted: 2021-03-03 | Resolved: 2024-11-19

## 2024-11-19 PROBLEM — I48.91 NEW ONSET ATRIAL FIBRILLATION: Status: RESOLVED | Noted: 2021-03-04 | Resolved: 2024-11-19

## 2024-11-19 LAB
ANION GAP SERPL CALCULATED.3IONS-SCNC: 9.1 MMOL/L (ref 5–15)
BASOPHILS # BLD AUTO: 0.03 10*3/MM3 (ref 0–0.2)
BASOPHILS NFR BLD AUTO: 0.8 % (ref 0–1.5)
BUN SERPL-MCNC: 14 MG/DL (ref 6–20)
BUN/CREAT SERPL: 11.8 (ref 7–25)
CALCIUM SPEC-SCNC: 9.2 MG/DL (ref 8.6–10.5)
CHLORIDE SERPL-SCNC: 103 MMOL/L (ref 98–107)
CHOLEST SERPL-MCNC: 121 MG/DL (ref 0–200)
CO2 SERPL-SCNC: 25.9 MMOL/L (ref 22–29)
CREAT SERPL-MCNC: 1.19 MG/DL (ref 0.76–1.27)
DEPRECATED RDW RBC AUTO: 42.1 FL (ref 37–54)
EGFRCR SERPLBLD CKD-EPI 2021: 75.8 ML/MIN/1.73
EOSINOPHIL # BLD AUTO: 0.1 10*3/MM3 (ref 0–0.4)
EOSINOPHIL NFR BLD AUTO: 2.6 % (ref 0.3–6.2)
ERYTHROCYTE [DISTWIDTH] IN BLOOD BY AUTOMATED COUNT: 12.5 % (ref 12.3–15.4)
GLUCOSE SERPL-MCNC: 96 MG/DL (ref 65–99)
HCT VFR BLD AUTO: 43 % (ref 37.5–51)
HCV AB SER QL: NORMAL
HDLC SERPL-MCNC: 46 MG/DL (ref 40–60)
HGB BLD-MCNC: 14.7 G/DL (ref 13–17.7)
IMM GRANULOCYTES # BLD AUTO: 0.01 10*3/MM3 (ref 0–0.05)
IMM GRANULOCYTES NFR BLD AUTO: 0.3 % (ref 0–0.5)
LDLC SERPL CALC-MCNC: 62 MG/DL (ref 0–100)
LDLC/HDLC SERPL: 1.38 {RATIO}
LYMPHOCYTES # BLD AUTO: 1.06 10*3/MM3 (ref 0.7–3.1)
LYMPHOCYTES NFR BLD AUTO: 27.4 % (ref 19.6–45.3)
MCH RBC QN AUTO: 32 PG (ref 26.6–33)
MCHC RBC AUTO-ENTMCNC: 34.2 G/DL (ref 31.5–35.7)
MCV RBC AUTO: 93.5 FL (ref 79–97)
MONOCYTES # BLD AUTO: 0.36 10*3/MM3 (ref 0.1–0.9)
MONOCYTES NFR BLD AUTO: 9.3 % (ref 5–12)
NEUTROPHILS NFR BLD AUTO: 2.31 10*3/MM3 (ref 1.7–7)
NEUTROPHILS NFR BLD AUTO: 59.6 % (ref 42.7–76)
NRBC BLD AUTO-RTO: 0 /100 WBC (ref 0–0.2)
PLATELET # BLD AUTO: 231 10*3/MM3 (ref 140–450)
PMV BLD AUTO: 10.5 FL (ref 6–12)
POTASSIUM SERPL-SCNC: 4.3 MMOL/L (ref 3.5–5.2)
PSA SERPL-MCNC: 0.3 NG/ML (ref 0–4)
RBC # BLD AUTO: 4.6 10*6/MM3 (ref 4.14–5.8)
SODIUM SERPL-SCNC: 138 MMOL/L (ref 136–145)
TRIGL SERPL-MCNC: 58 MG/DL (ref 0–150)
VLDLC SERPL-MCNC: 13 MG/DL (ref 5–40)
WBC NRBC COR # BLD AUTO: 3.87 10*3/MM3 (ref 3.4–10.8)

## 2024-11-19 PROCEDURE — 86803 HEPATITIS C AB TEST: CPT | Performed by: INTERNAL MEDICINE

## 2024-11-19 PROCEDURE — G0103 PSA SCREENING: HCPCS | Performed by: INTERNAL MEDICINE

## 2024-11-19 PROCEDURE — 80061 LIPID PANEL: CPT | Performed by: INTERNAL MEDICINE

## 2024-11-19 PROCEDURE — 85025 COMPLETE CBC W/AUTO DIFF WBC: CPT | Performed by: INTERNAL MEDICINE

## 2024-11-19 PROCEDURE — 36415 COLL VENOUS BLD VENIPUNCTURE: CPT

## 2024-11-19 PROCEDURE — 80048 BASIC METABOLIC PNL TOTAL CA: CPT | Performed by: INTERNAL MEDICINE

## 2024-11-19 PROCEDURE — 99214 OFFICE O/P EST MOD 30 MIN: CPT | Performed by: INTERNAL MEDICINE

## 2024-11-19 PROCEDURE — 99396 PREV VISIT EST AGE 40-64: CPT | Performed by: INTERNAL MEDICINE

## 2024-11-19 NOTE — PATIENT INSTRUCTIONS
Please stop at lab on second floor to have blood drawn    Due for tetanus vaccine; can get at any local pharmacy    Medications:  Continue current medications as prescribed    Monitor and record home blood pressure    Encourage healthy diet and exercise    Encourage tobacco cessation    Follow up with cardiology as scheduled    Follow up in one year or sooner if something arises

## 2024-11-19 NOTE — PROGRESS NOTES
Chief Complaint  Annual Exam    HPI:    Ashkan Farley presents to [facility]    Patient is a 49-year-old male presenting to [facility] care with new primary care provider.    Hyperlipidemia  Currently prescribed atorvastatin 40 mg daily.  Tolerating medication well without side effects.  Diet and exercise could be better.     A-fib  History of paroxysmal atrial fibrillation.  Follows with cardiology, most recently 1/26/2024.  Previously underwent workup including coronary angiogram, which was negative for coronary artery disease.  Not currently on rate/rhythm control medications or anticoagulation. No recent episodes of palpitations. Denies chest pain, shortness of breath, orthopnea, PND, lower extremity edema, palpitations, tachycardia     GERD/gastritis  Occasionally takes over-the-counter Pepcid or Prevacid. Typically triggered by certain foods, like onions. Denies dysphagia, odynophagia. No red flag symptoms.     Anxiety  Currently on Sertraline 100 mg daily as mood is overall generally slightly anxious. Tolerates medication well without side effects. Sleep and appetite overall good. Denies suicidal/homicidal ideations.     Blood pressure usually good. Occasionally has had borderline or elevated reading in office. No on medications.     Right rotator cuff tear  Nontraumatic complete tear of right rotator cuff status post surgical repair.  Tolerated procedure well without postop complications    Preventative:    Social:     Diet and Exercise: Could be better, especially exercise    Alcohol, Tobacco, and Recreational Drug use: Occasional alcohol; uses smokeless tobacco and currently using.     Cancer screenings:  PSA: No known family history of prostate cancer  Colonoscopy: No known family history of colon cancer; colonoscopy previously performed 10/25/2022.  2 polyps that were 1 to 2 mm noted in rectum.  Pathology notable for  hyperplastic polyps.    Immunizations:  "Due for tetanus    [employer] Directive: Not on file.       Review of Systems:  ROS negative unless otherwise noted in HPI above.    Past Medical History:   Diagnosis Date    Abnormal ECG 3/3/21    Allergic     Arithamiason    Atrial fibrillation     \"comes and goes\"    Depression     Hyperlipidemia 3/3/21         Current Outpatient Medications:     atorvastatin (LIPITOR) 40 MG tablet, TAKE 1 TABLET DAILY, Disp: 90 tablet, Rfl: 3    sertraline (ZOLOFT) 100 MG tablet, Take 1 tablet by mouth Daily., Disp: 90 tablet, Rfl: 1    Social History     Socioeconomic History    Marital status:    Tobacco Use    Smoking status: Never    Smokeless tobacco: Never    Tobacco comments:     Used smokeless tobacco for many years   Vaping Use    Vaping status: Never Used   Substance and Sexual Activity    Alcohol use: Yes     Alcohol/week: 6.0 standard drinks of alcohol     Types: 4 Cans of beer, 2 Drinks containing 0.5 oz of alcohol per week     Comment: social    Drug use: Never    Sexual activity: Yes     Partners: Female        Objective   Vital Signs:  /84   Pulse 60   Resp 18   Ht 180.3 cm (71\")   Wt 107 kg (235 lb 6.4 oz)   SpO2 98%   BMI 32.83 kg/m²   Estimated body mass index is 32.83 kg/m² as calculated from the following:    Height as of this encounter: 180.3 cm (71\").    Weight as of this encounter: 107 kg (235 lb 6.4 oz).    Physical Exam:  General: Well-appearing patient, no apparent distress  HEENT: No posterior pharynx erythema, no tonsillar erythema or exudates, normal external auditory canals, TM normal without bulging or erythema, small amount of middle ear effusion with right greater than left  Cardiac: Regular rate and rhythm, normal S1/S2, no murmur, rubs or gallops, no lower extremity edema  Lungs: Clear to auscultation bilaterally, no crackles or wheezes  Abdomen: Soft, non-tender, no guarding or rebound tenderness, no hepatosplenomegaly  Skin: No significant rashes or lesions  MSK: " Grossly normal tone and strength  Neuro: Alert and oriented x3, CN II-XII grossly intact  Psych: Appropriate mood and affect    Assessment and Plan:    (Z00.00) Preventative health care  Patient is a 49 year old male who is overall doing well. Reviewed social and family history. Encouraged increased healthy diet and exercise and discussed importance to overall health. Up to date with cancer screenings including PSA and colonoscopy. Discussed indicated vaccines based on age and comorbidities. No skin, mood concerns.  Plan:  - Encourage healthy diet and exercise  - Up date vaccines, if necessary; patient planning on getting vaccine at outside pharmacy  - Screening labs as ordered    (Z86.79) History of atrial fibrillation  Assessment: Follows with cardiology.  CHADS2-VASc score of 0.  No recent episodes off rate/rhythm control medications and anticoagulation.  Plan:  - Monitor for now  - Follow-up with cardiology as scheduled    (R03.0) Elevated blood pressure reading -   Assessment: Blood pressure slightly elevated in clinic.  Obtaining additional home blood pressure readings before considering starting antihypertensive medications.  Discussed the importance of healthy diet and exercise as well as nicotine cessation.  Plan:  - Basic metabolic panel, CBC & Differential  - Intermittently monitor home blood pressures and follow up if elevated  - Encourage healthy diet and exercise  - Encouraged nicotine cessation    (E78.5) Hyperlipidemia  Assessment: Stable on statin without side effects. Discussed importance of healthy diet and exercise.  Plan:  - Fasting lipid panel  - Continue statin without changes  - Discussed healthy diet and lifestyle     (F34.1) Persistent depressive disorder  Assessment: Mood overall stable on sertraline 100 mg daily.  Plan:  -Continue sertraline as prescribed  -Encourage patient to do things that he enjoys    (K29.00) GERD  Assessment: Symptoms generally well-controlled with avoidance of  certain foods and over-the-counter medications as needed.  No red flag symptoms or indications for endoscopy.  Plan:  - Pepcid as needed  - Avoid potential triggers    (Z11.59) Need for hepatitis C screening test - Plan: Hepatitis C Antibody    (Z12.5) Screening for prostate cancer - Plan: PSA SCREENING--previous PCP had intermittently been obtaining PSAs.  Currently asymptomatic.    BMI is >= 30 and <35. (Class 1 Obesity). The following options were offered after discussion;: Encouraged healthy diet and exercise.        Patient was given instructions and counseling regarding his condition or for health maintenance advice. Please see specific information pulled into the AVS if appropriate.       Dr Femi Barkley   Internal Medicine Physician  [facility]  [address]  [place], [facility]

## 2024-11-26 RX ORDER — ATORVASTATIN CALCIUM 40 MG/1
TABLET, FILM COATED ORAL
Qty: 90 TABLET | Refills: 1 | Status: SHIPPED | OUTPATIENT
Start: 2024-11-26

## 2025-02-28 ENCOUNTER — OFFICE VISIT (OUTPATIENT)
Dept: CARDIOLOGY | Facility: CLINIC | Age: 50
End: 2025-02-28
Payer: COMMERCIAL

## 2025-02-28 VITALS
OXYGEN SATURATION: 98 % | SYSTOLIC BLOOD PRESSURE: 106 MMHG | BODY MASS INDEX: 31.92 KG/M2 | WEIGHT: 228 LBS | DIASTOLIC BLOOD PRESSURE: 82 MMHG | HEART RATE: 75 BPM | HEIGHT: 71 IN

## 2025-02-28 DIAGNOSIS — I48.0 PAROXYSMAL ATRIAL FIBRILLATION: Primary | ICD-10-CM

## 2025-02-28 DIAGNOSIS — I49.3 UNIFOCAL PVCS: ICD-10-CM

## 2025-02-28 DIAGNOSIS — E78.49 OTHER HYPERLIPIDEMIA: ICD-10-CM

## 2025-02-28 DIAGNOSIS — R00.2 PALPITATIONS: ICD-10-CM

## 2025-02-28 PROCEDURE — 99213 OFFICE O/P EST LOW 20 MIN: CPT | Performed by: INTERNAL MEDICINE

## 2025-02-28 PROCEDURE — 93000 ELECTROCARDIOGRAM COMPLETE: CPT | Performed by: INTERNAL MEDICINE

## 2025-02-28 NOTE — PROGRESS NOTES
C--- paroxysmal atrial fibrillation     Sub--49-year-old male patient has history of atrial fibrillation, hyperlipidemia and asymptomatic PVCs and prior evaluation with normal EF and no coronary artery disease on cardiac catheterization  He does have a history of acid reflux in the past        Past Medical History:   Diagnosis Date   • Atrial fibrillation (CMS/HCC)    • Depression      Past Surgical History:   Procedure Laterality Date   • CARDIAC CATHETERIZATION N/A 3/5/2021    Procedure: LEFT HEART CATH with possible PCI;  Surgeon: Rick Severino DO;  Location: [facility] CATH INVASIVE LOCATION;  Service: Cardiology;  Laterality: N/A;  Local and IV sedation   • CARDIAC CATHETERIZATION N/A 3/5/2021    Procedure: Coronary angiography;  Surgeon: Rick Severino DO;  Location:  [facility] CATH INVASIVE LOCATION;  Service: Cardiology;  Laterality: N/A;   • CARDIAC CATHETERIZATION N/A 3/5/2021    Procedure: Left ventriculography;  Surgeon: Rick Severino DO;  Location: [facility] CATH INVASIVE LOCATION;  Service: Cardiology;  Laterality: N/A;   • SHOULDER SURGERY       Family History   Problem Relation Age of Onset   • Heart disease Father    • Atrial fibrillation Father          Physical Exam    General:      well developed, well nourished, in no acute distress.    Head:      normocephalic and atraumatic.    Eyes:      PERRL/EOM intact, conjunctivae and sclerae clear without nystagmus.    Neck:      no  thyromegaly, trachea central with normal respiratory effort  Lungs:      clear bilaterally to auscultation.    Heart:       regular rate and rhythm, S1, S2 without murmurs, rubs, or gallops  Skin:      intact without lesions or rashes.    Psych:      alert and cooperative; normal mood and affect; normal attention span and concentration.            A/P    History of paroxysmal atrial fibrillation without recurrence with [id number] score of 0  Hyperlipidemia on atorvastatin  Asymptomatic  outflow tract PVCs  Medications reviewed and follow-up appointments made  Recent LDL 62, CBC and BMP normal        ECG 12 Lead    Date/Time: 2/28/2025 10:22 AM  Performed by: Naren Darby MD    Authorized by: Naren Darby MD  Comparison: compared with previous ECG   Similar to previous ECG  Rhythm: sinus rhythm  Ectopy: unifocal PVCs  Rate: normal  Conduction: conduction normal      Electronically signed by Naren Darby MD, 02/28/25, 10:22 AM EST.

## 2025-02-28 NOTE — LETTER
February 28, 2025     Femi Barkley MD  [address]  [place] [address]    Patient: Ashkan Farley   YOB: 1975   Date of Visit: 2/28/2025     Dear Femi Barkley MD:       Thank you for referring Ashkan Farley to me for evaluation. Below are the relevant portions of my assessment and plan of care.    If you have questions, please do not hesitate to call me. I look forward to following Ashkan along with you.         Sincerely,        Naren Darby MD        CC: No Recipients    Naren Darby MD  02/28/25 1026  Sign when Signing Visit  C--- paroxysmal atrial fibrillation     Sub--49-year-old male patient has history of atrial fibrillation, hyperlipidemia and asymptomatic PVCs and prior evaluation with normal EF and no coronary artery disease on cardiac catheterization  He does have a history of acid reflux in the past        Past Medical History:   Diagnosis Date   • Atrial fibrillation (CMS/HCC)    • Depression      Past Surgical History:   Procedure Laterality Date   • CARDIAC CATHETERIZATION N/A 3/5/2021    Procedure: LEFT HEART CATH with possible PCI;  Surgeon: Rick Severino DO;  Location: [facility] CATH INVASIVE LOCATION;  Service: Cardiology;  Laterality: N/A;  Local and IV sedation   • CARDIAC CATHETERIZATION N/A 3/5/2021    Procedure: Coronary angiography;  Surgeon: Rick Severino DO;  Location:  [facility] INVASIVE LOCATION;  Service: Cardiology;  Laterality: N/A;   • CARDIAC CATHETERIZATION N/A 3/5/2021    Procedure: Left ventriculography;  Surgeon: Rick Severino DO;  Location: [facility] CATH INVASIVE LOCATION;  Service: Cardiology;  Laterality: N/A;   • SHOULDER SURGERY       Family History   Problem Relation Age of Onset   • Heart disease Father    • Atrial fibrillation Father          Physical Exam    General:      well developed, well nourished, in no acute distress.    Head:      normocephalic and atraumatic.     Eyes:      PERRL/EOM intact, conjunctivae and sclerae clear without nystagmus.    Neck:      no  thyromegaly, trachea central with normal respiratory effort  Lungs:      clear bilaterally to auscultation.    Heart:       regular rate and rhythm, S1, S2 without murmurs, rubs, or gallops  Skin:      intact without lesions or rashes.    Psych:      alert and cooperative; normal mood and affect; normal attention span and concentration.            A/P    History of paroxysmal atrial fibrillation without recurrence with [id number] score of 0  Hyperlipidemia on atorvastatin  Asymptomatic outflow tract PVCs  Medications reviewed and follow-up appointments made  Recent LDL 62, CBC and BMP normal        ECG 12 Lead    Date/Time: 2/28/2025 10:22 AM  Performed by: Naren Darby MD    Authorized by: Naren Darby MD  Comparison: compared with previous ECG   Similar to previous ECG  Rhythm: sinus rhythm  Ectopy: unifocal PVCs  Rate: normal  Conduction: conduction normal      Electronically signed by Naren Darby MD, 02/28/25, 10:22 AM EST.

## 2025-04-07 RX ORDER — SERTRALINE HYDROCHLORIDE 100 MG/1
100 TABLET, FILM COATED ORAL DAILY
Qty: 90 TABLET | Refills: 1 | Status: SHIPPED | OUTPATIENT
Start: 2025-04-07

## 2025-05-27 RX ORDER — ATORVASTATIN CALCIUM 40 MG/1
40 TABLET, FILM COATED ORAL DAILY
Qty: 90 TABLET | Refills: 3 | Status: SHIPPED | OUTPATIENT
Start: 2025-05-27

## (undated) DEVICE — CATH DIAG IMPULSE FL4 6F 100CM

## (undated) DEVICE — GW PTFE EMERALD HEPCOAT FC J TIP STD .035 3MM 150CM

## (undated) DEVICE — CATH DIAG IMPULSE PIG .056 6F 110CM

## (undated) DEVICE — PINNACLE INTRODUCER SHEATH: Brand: PINNACLE

## (undated) DEVICE — SINGLE-USE BIOPSY FORCEPS: Brand: RADIAL JAW 4

## (undated) DEVICE — PK TRY HEART CATH 50

## (undated) DEVICE — STARCLOSE SE VASCULAR CLOSURE SYSTEM: Brand: STARCLOSE SE

## (undated) DEVICE — CATH DIAG IMPULSE FR4 6F 100CM

## (undated) DEVICE — PK ENDO GI 50

## (undated) DEVICE — SKIN PREP TRAY W/CHG: Brand: MEDLINE INDUSTRIES, INC.